# Patient Record
Sex: FEMALE | Race: OTHER | HISPANIC OR LATINO | Employment: FULL TIME | URBAN - METROPOLITAN AREA
[De-identification: names, ages, dates, MRNs, and addresses within clinical notes are randomized per-mention and may not be internally consistent; named-entity substitution may affect disease eponyms.]

---

## 2017-04-03 ENCOUNTER — OFFICE VISIT (OUTPATIENT)
Dept: OBSTETRICS AND GYNECOLOGY | Facility: CLINIC | Age: 22
End: 2017-04-03
Payer: COMMERCIAL

## 2017-04-03 VITALS
DIASTOLIC BLOOD PRESSURE: 60 MMHG | SYSTOLIC BLOOD PRESSURE: 100 MMHG | WEIGHT: 122.81 LBS | BODY MASS INDEX: 22.6 KG/M2 | HEIGHT: 62 IN

## 2017-04-03 DIAGNOSIS — Z01.419 ENCOUNTER FOR GYNECOLOGICAL EXAMINATION: Primary | ICD-10-CM

## 2017-04-03 DIAGNOSIS — Z12.4 PAP SMEAR FOR CERVICAL CANCER SCREENING: ICD-10-CM

## 2017-04-03 PROCEDURE — 99385 PREV VISIT NEW AGE 18-39: CPT | Mod: S$GLB,,, | Performed by: OBSTETRICS & GYNECOLOGY

## 2017-04-03 PROCEDURE — 99999 PR PBB SHADOW E&M-NEW PATIENT-LVL II: CPT | Mod: PBBFAC,,, | Performed by: OBSTETRICS & GYNECOLOGY

## 2017-04-03 PROCEDURE — 88175 CYTOPATH C/V AUTO FLUID REDO: CPT

## 2017-04-03 RX ORDER — NORETHINDRONE ACETATE AND ETHINYL ESTRADIOL AND FERROUS FUMARATE 1MG-20(24)
1 KIT ORAL DAILY
Qty: 84 TABLET | Refills: 3 | Status: SHIPPED | OUTPATIENT
Start: 2017-04-03 | End: 2017-09-12 | Stop reason: SDUPTHER

## 2017-04-03 RX ORDER — NORETHINDRONE ACETATE AND ETHINYL ESTRADIOL AND FERROUS FUMARATE 1MG-20(24)
1 KIT ORAL DAILY
Refills: 11 | COMMUNITY
Start: 2017-03-24 | End: 2017-04-03 | Stop reason: SDUPTHER

## 2017-04-03 NOTE — MR AVS SNAPSHOT
[facility] [address]  [address]  [facility] [place] [address]  Phone: [phone]  Fax: [phone]                  Dominique Huynh   4/3/2017 2:30 PM   Office Visit    Description:  Female : 1995   Provider:  Sherry Mcguire MD   Department:  [facility] [address]           Reason for Visit     Gynecologic Exam                To Do List           Goals (5 Years of Data)     None      [facility]     [facility] On [facility] Nurse Care Line -  Assistance  Unless otherwise directed by your provider, please contact [facility], our nurse care line that is available for  assistance.     Registered nurses in the [facility] provide: appointment scheduling, clinical advisement, health education, and other advisory services.  Call: [phone] (toll free)               Medications           Message regarding Medications     Verify the changes and/or additions to your medication regime listed below are the same as discussed with your clinician today.  If any of these changes or additions are incorrect, please notify your healthcare provider.        STOP taking these medications     hydrocodone-acetaminophen 5-325mg (NORCO) 5-325 mg per tablet Take 1 tablet by mouth every 6 (six) hours as needed.           Verify that the below list of medications is an accurate representation of the medications you are currently taking.  If none reported, the list may be blank. If incorrect, please contact your healthcare provider. Carry this list with you in case of emergency.           Current Medications     MINASTRIN 24 FE 1 mg-20 mcg(24) /75 mg (4) Chew Take 1 tablet by mouth once daily.    sumatriptan (IMITREX) 50 MG tablet Take 1 tablet (50 mg total) by mouth as needed for Migraine (can repeat dose in 2 hours if neeeded.  Max daily dose 200mg.  ).           Clinical Reference Information           Your Vitals Were     BP Height Weight Last Period BMI    100/60 5'  "2" (1.575 m) 55.7 kg (122 lb 12.7 oz) 02/03/2017 22.46 kg/m2      Blood Pressure          Most Recent Value    BP  100/60      Allergies as of 4/3/2017     No Known Allergies      Immunizations Administered on Date of Encounter - 4/3/2017     None      [facility] Sign-Up     Activating your [facility] account is as easy as 1-2-3!     1) Visit [email or web address], select Sign Up Now, enter this activation code and your date of birth, then select Next.  [email or web address]  Expires: 5/18/2017  2:45 PM      2) Create a username and password to use when you visit [facility] in the future and select a security question in case you lose your password and select Next.    3) Enter your e-mail address and click Sign Up!    Additional Information  If you have questions, please e-mail [email or web address] or call [phone] to talk to our [facility] staff. Remember, [facility] is NOT to be used for urgent needs. For medical emergencies, dial 911.         Language Assistance Services     ATTENTION: Language assistance services are available, free of charge. Please call [phone].      ATENCIÓN: Si kiel oh, tiene a clement disposición servicios gratuitos de asistencia lingüística. Llame al [phone].     CHÚ Ý: N?u b?n nói Ti?ng Vi?t, có các d?ch v? h? tr? ngôn ng? mi?n phí dành cho b?n. G?i s? [phone].         [facility] [address] complies with applicable Federal civil rights laws and does not discriminate on the basis of race, color, national origin, age, disability, or sex.        "

## 2017-04-03 NOTE — PROGRESS NOTES
HPI: Pt is a 21 y.o.  female who presents for routine annual exam. She uses OCP for contraception as well as for treatment of endometriosis. She does not want STD screening. No complaints.       ROS:  GENERAL: Feeling well overall. Denies fever or chills.   SKIN: Denies rash or lesions.   HEAD: Denies head injury or headache.   NODES: Denies enlarged lymph nodes.   CHEST: Denies chest pain or shortness of breath.   CARDIOVASCULAR: Denies palpitations or left sided chest pain.   ABDOMEN: No abdominal pain, constipation, diarrhea, nausea, vomiting or rectal bleeding.   URINARY: No dysuria, hematuria, or burning on urination.  REPRODUCTIVE: See HPI.   BREASTS: Denies pain, lumps, or nipple discharge.   HEMATOLOGIC: No easy bruisability or excessive bleeding.   MUSCULOSKELETAL: Denies joint pain or swelling.   NEUROLOGIC: Denies syncope or weakness.   PSYCHIATRIC: Denies depression, anxiety or mood swings.    PE:   APPEARANCE: Well nourished, well developed, White female in no acute distress.  NODES: no cervical, supraclavicular, or inguinal lymphadenopathy  BREASTS: Symmetrical, no skin changes or visible lesions. No palpable masses, nipple discharge or adenopathy bilaterally.  ABDOMEN: Soft. No tenderness or masses. No distention. No hernias palpated. No CVA tenderness.  VULVA: No lesions. Normal external female genitalia.  URETHRAL MEATUS: Normal size and location, no lesions, no prolapse.  URETHRA: No masses, tenderness, or prolapse.  VAGINA: Moist. No lesions or lacerations noted. No abnormal discharge present. No odor present.   CERVIX: No lesions or discharge. No cervical motion tenderness.   UTERUS: Normal size, regular shape, mobile, non-tender.  ADNEXA: No tenderness. No fullness or masses palpated in the adnexal regions.   ANUS PERINEUM: Normal.      Diagnosis:  1. Encounter for gynecological examination    2. Pap smear for cervical cancer screening        Plan:     Orders Placed This Encounter     Liquid-based pap smear, screening    MINASTRIN 24 FE 1 mg-20 mcg(24) /75 mg (4) Chew       Patient was counseled today on the new ACS guidelines for cervical cytology screening as well as the current recommendations for breast cancer screening. She was counseled to follow up with her PCP for other routine health maintenance. Counseling session lasted approximately 10 minutes, and all her questions were answered.    Follow-up with me in 1 year for routine exam; pap in 3 years. PT only wants pelvic exam q 3 years if paps normal.

## 2017-05-12 ENCOUNTER — HOSPITAL ENCOUNTER (OUTPATIENT)
Dept: RADIOLOGY | Facility: HOSPITAL | Age: 22
Discharge: HOME | End: 2017-05-12
Attending: ORTHOPAEDIC SURGERY
Payer: COMMERCIAL

## 2017-05-12 ENCOUNTER — PATIENT MESSAGE (OUTPATIENT)
Dept: ORTHOPEDICS | Facility: CLINIC | Age: 22
End: 2017-05-12

## 2017-05-12 ENCOUNTER — TELEPHONE (OUTPATIENT)
Dept: ORTHOPEDICS | Facility: CLINIC | Age: 22
End: 2017-05-12

## 2017-05-12 ENCOUNTER — OFFICE VISIT (OUTPATIENT)
Dept: ORTHOPEDICS | Facility: CLINIC | Age: 22
End: 2017-05-12
Payer: COMMERCIAL

## 2017-05-12 DIAGNOSIS — M25.562 ACUTE PAIN OF LEFT KNEE: Primary | ICD-10-CM

## 2017-05-12 DIAGNOSIS — M25.562 ACUTE PAIN OF LEFT KNEE: ICD-10-CM

## 2017-05-12 PROCEDURE — 1160F RVW MEDS BY RX/DR IN RCRD: CPT | Mod: S$GLB,,, | Performed by: PHYSICIAN ASSISTANT

## 2017-05-12 PROCEDURE — 99999 PR PBB SHADOW E&M-EST. PATIENT-LVL II: CPT | Mod: PBBFAC,,, | Performed by: PHYSICIAN ASSISTANT

## 2017-05-12 PROCEDURE — 99203 OFFICE O/P NEW LOW 30 MIN: CPT | Mod: S$GLB,,, | Performed by: PHYSICIAN ASSISTANT

## 2017-05-12 NOTE — TELEPHONE ENCOUNTER
Ortho Telephone Triage Message  1500  Patient C/O: left knee pain. Requests same day Ortho appt.   Resolution: Noted appt scheduled with Dawit CALLAHAN PA at 3:00pm today.

## 2017-05-12 NOTE — TELEPHONE ENCOUNTER
----- Message from Bessy Cruz sent at 5/12/2017 12:43 PM CDT -----  Contact: pt  Pt called and states she wants to be seen today due to she can not walk or stand due to her left knee pain, she would like for someone to call her in regards to an appt for today. Pt can be reached at [phone].

## 2017-05-13 ENCOUNTER — HOSPITAL ENCOUNTER (OUTPATIENT)
Dept: RADIOLOGY | Facility: HOSPITAL | Age: 22
Discharge: HOME | End: 2017-05-13
Attending: ORTHOPAEDIC SURGERY
Payer: COMMERCIAL

## 2017-05-13 DIAGNOSIS — M25.562 ACUTE PAIN OF LEFT KNEE: ICD-10-CM

## 2017-05-13 PROCEDURE — 73721 MRI JNT OF LWR EXTRE W/O DYE: CPT | Mod: 26,LT,, | Performed by: RADIOLOGY

## 2017-05-13 PROCEDURE — 73721 MRI JNT OF LWR EXTRE W/O DYE: CPT | Mod: TC,LT

## 2017-05-15 ENCOUNTER — TELEPHONE (OUTPATIENT)
Dept: ORTHOPEDICS | Facility: CLINIC | Age: 22
End: 2017-05-15

## 2017-05-15 NOTE — PROGRESS NOTES
Subjective:      Patient ID: Dominique Huynh is a 21 y.o. female.    Chief Complaint: No chief complaint on file.    HPI    Patient is a 21 year old female who presents to clinic with chief complaint of sudden onset of sharp left knee pain x 1 week. Associated symptoms include swelling Patient was seen in an urgent care who got an x-ray, no fracture seen. Patient has been using crutches to assist with ambulation. Pain is creased with all range of motion and weightbearing. Patient is taking NSAID without relief. Patient is also using a knee brace. She denied fever or shills or history of gout.    Review of Systems   Constitution: Negative for chills and fever.   Cardiovascular: Negative for chest pain.   Respiratory: Negative for cough and shortness of breath.    Skin: Negative for color change, dry skin, itching, nail changes, poor wound healing and rash.   Musculoskeletal:        Left knee pain.    Neurological: Negative for dizziness.   Psychiatric/Behavioral: Negative for altered mental status. The patient is not nervous/anxious.    All other systems reviewed and are negative.        Objective:      General    Constitutional: She is oriented to person, place, and time. She appears well-developed and well-nourished. No distress.   HENT:   Head: Atraumatic.   Eyes: Conjunctivae are normal.   Cardiovascular: Normal rate.    Pulmonary/Chest: Effort normal.   Neurological: She is alert and oriented to person, place, and time.   Psychiatric: She has a normal mood and affect. Her behavior is normal.             Left Knee Exam     Inspection   Scars: absent  Swelling: present  Bruising: absent    Range of Motion   Extension: abnormal   Flexion: abnormal     Other   Sensation: normal    Comments:  Patient came in to clinic with knee brace in place as well as crutches,   NWB  Mild swelling  Very TTP with light and deep palpation,   No erythema or increased warmth          Assessment:       Encounter Diagnosis    Name Primary?    Acute pain of left knee Yes          Plan:       Discussed plan with patient.   - rest ice elevation compression  - Order for labs placed, CBC, CPR, SED  - Order for MRI placed.   - Will call with results, follow up is pending results.

## 2017-09-05 ENCOUNTER — TELEPHONE (OUTPATIENT)
Dept: OBSTETRICS AND GYNECOLOGY | Facility: CLINIC | Age: 22
End: 2017-09-05

## 2017-09-05 NOTE — TELEPHONE ENCOUNTER
----- Message from Ross Whipple sent at 9/5/2017 11:27 AM CDT -----  Contact: Pt (change of pharm)  X_ 1st Request  _ 2nd Request  _ 3rd Request    Who: U[facility] LE [[id number]]    Why: Patient is requesting a refill of MINASTRIN 24 FE 1 mg-20 mcg(24) /75 mg (4) Chew sent to be called into [facility]  #patient would like a call back when prescription is called in#    What Number to Call Back: [phone]    When to Expect a call back: (Before the end of the day)  -- if call after 3:00 call back will be tomorrow.

## 2017-09-12 RX ORDER — NORETHINDRONE ACETATE AND ETHINYL ESTRADIOL AND FERROUS FUMARATE 1MG-20(24)
1 KIT ORAL DAILY
Qty: 84 TABLET | Refills: 2 | Status: SHIPPED | OUTPATIENT
Start: 2017-09-12 | End: 2018-03-29 | Stop reason: DRUGHIGH

## 2017-09-12 NOTE — TELEPHONE ENCOUNTER
----- Message from Venita Rothman sent at 9/12/2017  4:26 PM CDT -----  Contact: self  Contact: Pt (change of pharm)  X_ 1st Request  _ 2nd Request  _ 3rd Request     Who: U[facility] LE [[id number]]     Why: Patient is requesting a refill of MINASTRIN 24 FE 1 mg-20 mcg(24) /75 mg (4) Chew sent to be [facility]  #patient would like a call back when prescription is called in#     What Number to Call Back: [phone]     When to Expect a call back: (Before the end of the day)  -- if call after 3:00 call back will be tomorrow.

## 2018-03-29 ENCOUNTER — OFFICE VISIT (OUTPATIENT)
Dept: OBSTETRICS AND GYNECOLOGY | Facility: CLINIC | Age: 23
End: 2018-03-29
Attending: OBSTETRICS & GYNECOLOGY
Payer: COMMERCIAL

## 2018-03-29 VITALS
SYSTOLIC BLOOD PRESSURE: 110 MMHG | BODY MASS INDEX: 23.37 KG/M2 | HEIGHT: 62 IN | WEIGHT: 127 LBS | DIASTOLIC BLOOD PRESSURE: 84 MMHG

## 2018-03-29 DIAGNOSIS — R10.2 FEMALE PELVIC PAIN: Primary | ICD-10-CM

## 2018-03-29 DIAGNOSIS — R35.0 URINARY FREQUENCY: ICD-10-CM

## 2018-03-29 DIAGNOSIS — N94.6 DYSMENORRHEA: ICD-10-CM

## 2018-03-29 PROCEDURE — 87088 URINE BACTERIA CULTURE: CPT

## 2018-03-29 PROCEDURE — 87077 CULTURE AEROBIC IDENTIFY: CPT

## 2018-03-29 PROCEDURE — 87086 URINE CULTURE/COLONY COUNT: CPT

## 2018-03-29 PROCEDURE — 87186 SC STD MICRODIL/AGAR DIL: CPT

## 2018-03-29 PROCEDURE — 99999 PR PBB SHADOW E&M-EST. PATIENT-LVL III: CPT | Mod: PBBFAC,,, | Performed by: OBSTETRICS & GYNECOLOGY

## 2018-03-29 PROCEDURE — 81001 URINALYSIS AUTO W/SCOPE: CPT

## 2018-03-29 PROCEDURE — 99213 OFFICE O/P EST LOW 20 MIN: CPT | Mod: S$GLB,,, | Performed by: OBSTETRICS & GYNECOLOGY

## 2018-03-29 PROCEDURE — 87491 CHLMYD TRACH DNA AMP PROBE: CPT

## 2018-03-29 RX ORDER — DICLOFENAC POTASSIUM 50 MG/1
50 TABLET, FILM COATED ORAL 2 TIMES DAILY
Qty: 30 TABLET | Refills: 12 | Status: SHIPPED | OUTPATIENT
Start: 2018-03-29 | End: 2018-03-29 | Stop reason: SDUPTHER

## 2018-03-29 RX ORDER — LEVONORGESTREL / ETHINYL ESTRADIOL AND ETHINYL ESTRADIOL 150-30(84)
1 KIT ORAL DAILY
Qty: 91 EACH | Refills: 4 | Status: SHIPPED | OUTPATIENT
Start: 2018-03-29 | End: 2018-07-12 | Stop reason: ALTCHOICE

## 2018-03-29 RX ORDER — DICLOFENAC POTASSIUM 50 MG/1
50 TABLET, FILM COATED ORAL 2 TIMES DAILY
Qty: 30 TABLET | Refills: 12 | Status: SHIPPED | OUTPATIENT
Start: 2018-03-29 | End: 2018-10-30

## 2018-03-29 NOTE — PROGRESS NOTES
Dominique Huynh is a 22 y.o. female patient  NEW TO ME(patient of Dr. Mcguire) who presents today with complaints of pelvic pain /cramping which began a few days ago and have worsened. She also has urinary frequency and back pain for several days. She has a history of a laparoscopy for Endometriosis, diagnosed by laparoscopy by Dr. Kelly several years ago and takes her OCP's continuously for several months at a time to avoid having menstrual cycles. She denies currently being sexually active.  Patient's last menstrual period was 2018 (approximate).    Past Medical History:   Diagnosis Date    Migraines      Past Surgical History:   Procedure Laterality Date    PELVIC LAPAROSCOPY      Endometriosis     Social History     Social History    Marital status: Single     Spouse name: N/A    Number of children: N/A    Years of education: N/A     Occupational History     at [employer]     Social History Main Topics    Smoking status: Never Smoker    Smokeless tobacco: Never Used    Alcohol use No    Drug use: No    Sexual activity: Not Currently     Partners: Male     Birth control/ protection: None, OCP      Comment: has not been sexually active      Other Topics Concern    Not on file     Social History Narrative    Lives w/mother.     Family History   Problem Relation Age of Onset    Diabetes Maternal Aunt     Miscarriages / Stillbirths Mother     Diabetes Father     Breast cancer Neg Hx     Colon cancer Neg Hx     Ovarian cancer Neg Hx      OB History      Para Term  AB Living    0 0 0 0 0 0    SAB TAB Ectopic Multiple Live Births    0 0 0 0                  ROS:  GENERAL: Feeling well overall.   SKIN: Denies rash or lesions.   HEAD: Denies head injury or headache.   NODES: Denies enlarged lymph nodes.   CHEST: Denies chest pain or shortness of breath.   CARDIOVASCULAR: Denies palpitations or left sided chest pain.   ABDOMEN: No abdominal pain,  "nausea, vomiting or rectal bleeding.   URINARY: No dysuria, hematuria, or burning on urination.  REPRODUCTIVE: See HPI.   BREASTS: Denies pain, lumps, or nipple discharge.   HEMATOLOGIC: No easy bruisability or excessive bleeding.   MUSCULOSKELETAL: Denies joint pain or swelling.   NEUROLOGIC: Denies syncope or weakness.   PSYCHIATRIC: Denies depression.    /84   Ht 5' 2" (1.575 m)   Wt 57.6 kg (126 lb 15.8 oz)   LMP 01/20/2018 (Approximate)   BMI 23.23 kg/m²     PE:   APPEARANCE: Well nourished, well developed, in no acute distress.  ABDOMEN: Soft. No masses. No hernias. No CVA tenderness. MILD TENDERNESS DIRECTLY BENEATH LAPAROSCOPY SCAR ON RLQ.   VULVA: No lesions. Normal female genitalia.  URETHRAL MEATUS: Normal size and location, no lesions, no prolapse.  URETHRA: No masses, tenderness, prolapse or scarring.  VAGINA: Moist and well rugated, no discharge, no significant cystocele or rectocele.  CERVIX: No lesions and discharge. NO CMT  UTERUS: Normal size, regular shape, mobile, non-tender, bladder base nontender.  ADNEXA: No masses, tenderness or CDS nodularity. NO CDS TENDERNESS  ANUS PERINEUM: Normal.    PROCEDURES:    1. Female pelvic pain  C. trachomatis/N. gonorrhoeae by AMP DNA Cervix   2. Urinary frequency  Urine culture    Urinalysis   3. Dysmenorrhea  L norgest/e.estradiol-e.estrad 0.15 mg-30 mcg (84)/10 mcg (7) 3MPk    AND PLAN:    Discussed use of continuous use low dose pills to reduce incidence of dysmenorrhea, as well as the option of using DepoProvera (she declined as it caused her weight gain in the past). Discussed Progesterone IUD. Discussed use of Lupron if pain continues/worsens.     Follow-up if symptoms worsen or fail to improve.    "

## 2018-03-30 LAB
BACTERIA #/AREA URNS AUTO: NORMAL /HPF
BILIRUB UR QL STRIP: NEGATIVE
CLARITY UR REFRACT.AUTO: CLEAR
COLOR UR AUTO: ABNORMAL
GLUCOSE UR QL STRIP: NEGATIVE
HGB UR QL STRIP: NEGATIVE
KETONES UR QL STRIP: NEGATIVE
LEUKOCYTE ESTERASE UR QL STRIP: NEGATIVE
MICROSCOPIC COMMENT: NORMAL
NITRITE UR QL STRIP: POSITIVE
PH UR STRIP: 6 [PH] (ref 5–8)
PROT UR QL STRIP: NEGATIVE
RBC #/AREA URNS AUTO: 1 /HPF (ref 0–4)
SP GR UR STRIP: 1.02 (ref 1–1.03)
SQUAMOUS #/AREA URNS AUTO: 0 /HPF
URN SPEC COLLECT METH UR: ABNORMAL
UROBILINOGEN UR STRIP-ACNC: 2 EU/DL
WBC #/AREA URNS AUTO: 0 /HPF (ref 0–5)

## 2018-03-31 LAB
C TRACH DNA SPEC QL NAA+PROBE: NOT DETECTED
N GONORRHOEA DNA SPEC QL NAA+PROBE: NOT DETECTED

## 2018-04-02 DIAGNOSIS — N39.0 UTI (URINARY TRACT INFECTION), UNCOMPLICATED: Primary | ICD-10-CM

## 2018-04-02 LAB — BACTERIA UR CULT: NORMAL

## 2018-04-02 RX ORDER — SULFAMETHOXAZOLE AND TRIMETHOPRIM 800; 160 MG/1; MG/1
1 TABLET ORAL 2 TIMES DAILY
Qty: 14 TABLET | Refills: 0 | Status: SHIPPED | OUTPATIENT
Start: 2018-04-02 | End: 2018-10-01 | Stop reason: ALTCHOICE

## 2018-04-02 RX ORDER — PHENAZOPYRIDINE HYDROCHLORIDE 200 MG/1
200 TABLET, FILM COATED ORAL 3 TIMES DAILY PRN
Qty: 6 TABLET | Refills: 0 | Status: SHIPPED | OUTPATIENT
Start: 2018-04-02 | End: 2018-04-04

## 2018-07-11 ENCOUNTER — TELEPHONE (OUTPATIENT)
Dept: OBSTETRICS AND GYNECOLOGY | Facility: CLINIC | Age: 23
End: 2018-07-11

## 2018-07-11 NOTE — TELEPHONE ENCOUNTER
Pt requests change of birth control medication. States that she is having acne breakouts on her cheeks and noticed that this only started when she started this medication.

## 2018-07-11 NOTE — TELEPHONE ENCOUNTER
----- Message from Zeferino Rand sent at 7/11/2018  1:23 PM CDT -----  Please call pt regarding her birth control [phone]

## 2018-07-12 DIAGNOSIS — N94.6 DYSMENORRHEA: Primary | ICD-10-CM

## 2018-07-12 RX ORDER — DESOGESTREL AND ETHINYL ESTRADIOL 0.15-0.03
1 KIT ORAL DAILY
Qty: 28 TABLET | Refills: 12 | Status: SHIPPED | OUTPATIENT
Start: 2018-07-12 | End: 2018-10-30 | Stop reason: SDUPTHER

## 2018-07-12 NOTE — TELEPHONE ENCOUNTER
Discussed acne on seasonale. Will switch to Desogen, and advised to take daily x 3 weeks, skip placebo week and continue immediately with new pack.

## 2018-10-01 ENCOUNTER — HOSPITAL ENCOUNTER (OUTPATIENT)
Dept: RADIOLOGY | Facility: OTHER | Age: 23
Discharge: HOME | End: 2018-10-01
Attending: OBSTETRICS & GYNECOLOGY

## 2018-10-01 ENCOUNTER — OFFICE VISIT (OUTPATIENT)
Dept: OBSTETRICS AND GYNECOLOGY | Facility: CLINIC | Age: 23
End: 2018-10-01
Attending: OBSTETRICS & GYNECOLOGY

## 2018-10-01 VITALS
DIASTOLIC BLOOD PRESSURE: 62 MMHG | BODY MASS INDEX: 22.84 KG/M2 | WEIGHT: 124.13 LBS | HEIGHT: 62 IN | SYSTOLIC BLOOD PRESSURE: 90 MMHG

## 2018-10-01 DIAGNOSIS — R10.2 FEMALE PELVIC PAIN: ICD-10-CM

## 2018-10-01 DIAGNOSIS — R10.2 FEMALE PELVIC PAIN: Primary | ICD-10-CM

## 2018-10-01 LAB
B-HCG UR QL: NEGATIVE
CTP QC/QA: YES

## 2018-10-01 PROCEDURE — 76856 US EXAM PELVIC COMPLETE: CPT | Mod: TC

## 2018-10-01 PROCEDURE — 87086 URINE CULTURE/COLONY COUNT: CPT

## 2018-10-01 PROCEDURE — 76856 US EXAM PELVIC COMPLETE: CPT | Mod: 26,,, | Performed by: RADIOLOGY

## 2018-10-01 PROCEDURE — 99213 OFFICE O/P EST LOW 20 MIN: CPT | Mod: S$PBB,,, | Performed by: OBSTETRICS & GYNECOLOGY

## 2018-10-01 PROCEDURE — 99999 PR PBB SHADOW E&M-EST. PATIENT-LVL III: CPT | Mod: PBBFAC,,, | Performed by: OBSTETRICS & GYNECOLOGY

## 2018-10-01 PROCEDURE — 76830 TRANSVAGINAL US NON-OB: CPT | Mod: TC

## 2018-10-01 PROCEDURE — 81025 URINE PREGNANCY TEST: CPT | Mod: PBBFAC | Performed by: OBSTETRICS & GYNECOLOGY

## 2018-10-01 PROCEDURE — 99213 OFFICE O/P EST LOW 20 MIN: CPT | Mod: PBBFAC,25 | Performed by: OBSTETRICS & GYNECOLOGY

## 2018-10-01 PROCEDURE — 76830 TRANSVAGINAL US NON-OB: CPT | Mod: 26,,, | Performed by: RADIOLOGY

## 2018-10-01 PROCEDURE — 87491 CHLMYD TRACH DNA AMP PROBE: CPT

## 2018-10-01 NOTE — PROGRESS NOTES
Dominique Huynh is a 23 y.o. female patient  who presents today with complaints of lower back pain and cramps for the past 2 weeks. She denies dyspareunia. She had a light cycle lasting 3 days when she took a week off her pills to allow a withdrawal bleed and the period was not excessively painful.   Patient's last menstrual period was 2018 (approximate).    Past Medical History:   Diagnosis Date    Migraines      Past Surgical History:   Procedure Laterality Date    PELVIC LAPAROSCOPY      Endometriosis     Social History     Socioeconomic History    Marital status: Single     Spouse name: Not on file    Number of children: Not on file    Years of education: Not on file    Highest education level: Not on file   Social Needs    Financial resource strain: Not on file    Food insecurity - worry: Not on file    Food insecurity - inability: Not on file    Transportation needs - medical: Not on file    Transportation needs - non-medical: Not on file   Occupational History    Occupation:  at [employer]     Employer: [employer]   Tobacco Use    Smoking status: Never Smoker    Smokeless tobacco: Never Used   Substance and Sexual Activity    Alcohol use: No    Drug use: No    Sexual activity: Yes     Partners: Male     Birth control/protection: OCP     Comment: sexually active for 5 months- same partner   Other Topics Concern    Not on file   Social History Narrative    Lives w/mother.     Family History   Problem Relation Age of Onset    Diabetes Maternal Aunt     Miscarriages / Stillbirths Mother     Diabetes Father     Breast cancer Neg Hx     Colon cancer Neg Hx     Ovarian cancer Neg Hx      OB History      Para Term  AB Living    0 0 0 0 0 0    SAB TAB Ectopic Multiple Live Births    0 0 0 0                  ROS:  GENERAL: Feeling well overall.   SKIN: Denies rash or lesions.   HEAD: Denies head injury or headache.   NODES: Denies enlarged lymph nodes.  "  CHEST: Denies chest pain or shortness of breath.   CARDIOVASCULAR: Denies palpitations or left sided chest pain.   ABDOMEN: No abdominal pain, nausea, vomiting or rectal bleeding.   URINARY: No dysuria, hematuria, or burning on urination.  REPRODUCTIVE: See HPI.   BREASTS: Denies pain, lumps, or nipple discharge.   HEMATOLOGIC: No easy bruisability or excessive bleeding.   MUSCULOSKELETAL: Denies joint pain or swelling.   NEUROLOGIC: Denies syncope or weakness.   PSYCHIATRIC: Denies depression.    BP 90/62 (BP Location: Left arm)   Ht 5' 2" (1.575 m)   Wt 56.3 kg (124 lb 1.9 oz)   LMP 09/17/2018 (Approximate)   BMI 22.70 kg/m²     PE:   APPEARANCE: Well nourished, well developed, in no acute distress.  ABDOMEN: Soft. No tenderness or masses. No hernias. No CVA tenderness.  VULVA: No lesions. Normal female genitalia.  URETHRAL MEATUS: Normal size and location, no lesions, no prolapse.  URETHRA: No masses, tenderness, prolapse or scarring.  VAGINA: Moist and well rugated, no discharge, no significant cystocele or rectocele.  CERVIX: No lesions and discharge.   UTERUS: Normal size, regular shape, mobile, non-tender, bladder base nontender.  ADNEXA: No masses, tenderness or CDS nodularity.  ANUS PERINEUM: Normal.    PROCEDURES:    1. Female pelvic pain  C. trachomatis/N. gonorrhoeae by AMP DNA    US Pelvis Comp with Transvag NON-OB (xpd    Urine culture    POCT Urine Pregnancy    AND PLAN:    Discussed medical management, including Depo Provera and Progesterone IUD. Patient will consider.   Follow-up if symptoms worsen or fail to improve.    "

## 2018-10-02 DIAGNOSIS — R10.9 ABDOMINAL CRAMPS: Primary | ICD-10-CM

## 2018-10-02 LAB
C TRACH DNA SPEC QL NAA+PROBE: NOT DETECTED
N GONORRHOEA DNA SPEC QL NAA+PROBE: NOT DETECTED

## 2018-10-02 RX ORDER — DICYCLOMINE HYDROCHLORIDE 10 MG/1
10 CAPSULE ORAL 2 TIMES DAILY
Qty: 60 CAPSULE | Refills: 3 | Status: SHIPPED | OUTPATIENT
Start: 2018-10-02 | End: 2018-10-30

## 2018-10-03 ENCOUNTER — PATIENT MESSAGE (OUTPATIENT)
Dept: OBSTETRICS AND GYNECOLOGY | Facility: CLINIC | Age: 23
End: 2018-10-03

## 2018-10-03 LAB
BACTERIA UR CULT: NORMAL
BACTERIA UR CULT: NORMAL

## 2018-10-15 ENCOUNTER — LAB VISIT (OUTPATIENT)
Dept: LAB | Facility: OTHER | Age: 23
End: 2018-10-15
Attending: OBSTETRICS & GYNECOLOGY

## 2018-10-15 ENCOUNTER — OFFICE VISIT (OUTPATIENT)
Dept: OBSTETRICS AND GYNECOLOGY | Facility: CLINIC | Age: 23
End: 2018-10-15
Attending: OBSTETRICS & GYNECOLOGY

## 2018-10-15 VITALS
HEIGHT: 62 IN | BODY MASS INDEX: 23.4 KG/M2 | SYSTOLIC BLOOD PRESSURE: 100 MMHG | DIASTOLIC BLOOD PRESSURE: 60 MMHG | WEIGHT: 127.19 LBS

## 2018-10-15 DIAGNOSIS — R10.2 FEMALE PELVIC PAIN: Primary | ICD-10-CM

## 2018-10-15 DIAGNOSIS — R10.2 FEMALE PELVIC PAIN: ICD-10-CM

## 2018-10-15 DIAGNOSIS — N80.9 ENDOMETRIOSIS: ICD-10-CM

## 2018-10-15 PROCEDURE — 99213 OFFICE O/P EST LOW 20 MIN: CPT | Mod: PBBFAC | Performed by: OBSTETRICS & GYNECOLOGY

## 2018-10-15 PROCEDURE — 87086 URINE CULTURE/COLONY COUNT: CPT

## 2018-10-15 PROCEDURE — 99213 OFFICE O/P EST LOW 20 MIN: CPT | Mod: S$PBB,,, | Performed by: OBSTETRICS & GYNECOLOGY

## 2018-10-15 PROCEDURE — 99999 PR PBB SHADOW E&M-EST. PATIENT-LVL III: CPT | Mod: PBBFAC,,, | Performed by: OBSTETRICS & GYNECOLOGY

## 2018-10-15 RX ORDER — TRAMADOL HYDROCHLORIDE 50 MG/1
50 TABLET ORAL EVERY 8 HOURS PRN
Qty: 20 TABLET | Refills: 0 | Status: SHIPPED | OUTPATIENT
Start: 2018-10-15 | End: 2018-10-31 | Stop reason: SDUPTHER

## 2018-10-15 NOTE — PROGRESS NOTES
Subjective:       Patient ID: Dominique Huynh is a 23 y.o. female.    Chief Complaint:  Cramping      History of Present Illness  HPI  The patient presents with continued pelvic pain , cramping and lower back pain. She has a history of laparoscopy proven endometriosis for which she has been on continuous OCP's since . She has also recent had several Uti's for which she has been treated.  A trial of Bentyl has been unsuccessful. She appears uncomfortable, and states she is having trouble going to classes due to discomfort.    GYN & OB History  Patient's last menstrual period was 2018 (approximate).   Date of Last Pap: 2017  U/S: 10-1-2018:  EXAMINATION:  US PELVIS COMP WITH TRANSVAG NON-OB (XPD)    CLINICAL HISTORY:  Pelvic and perineal pain    TECHNIQUE:  Transabdominal sonography of the pelvis was performed, followed by transvaginal sonography to better evaluate the uterus and ovaries.    COMPARISON:  2013    FINDINGS:  Uterus:    Size: 5.9 x 3.2 x 3.3 cm    Appearance: No masses.    Endometrium: Not thickened in this pre menopausal patient, measuring 2 mm.    Right ovary:    Size: 2.3 x 1.3 x 2.1 cm    Appearance: Within normal limits    Vascular flow: Normal.    Left ovary:    Size: 2.2 x 1.7 x 1.4 cm    Appearance: Within normal limits    Vascular Flow: Normal.    Free Fluid:    None.      Impression       No sonographic abnormality.         OB History    Para Term  AB Living   0 0 0 0 0 0   SAB TAB Ectopic Multiple Live Births   0 0 0 0               Review of Systems  Review of Systems   Constitutional: Negative for activity change, appetite change, fatigue and unexpected weight change.   HENT: Negative.    Eyes: Negative for visual disturbance.   Respiratory: Negative for shortness of breath and wheezing.    Cardiovascular: Negative for chest pain, palpitations and leg swelling.   Gastrointestinal: Negative for abdominal pain, bloating and blood in stool.    Endocrine: Negative for diabetes and hair loss.   Genitourinary: Positive for dyspareunia, frequency and pelvic pain. Negative for decreased libido.   Musculoskeletal: Negative for back pain and joint swelling.   Skin:  Negative for no acne and hair changes.   Neurological: Negative for headaches.   Hematological: Does not bruise/bleed easily.   Psychiatric/Behavioral: Negative for depression and sleep disturbance. The patient is not nervous/anxious.    Breast: Negative for breast pain and nipple discharge          Objective:      Physical Exam:   Constitutional: She is oriented to person, place, and time. She appears well-developed and well-nourished.    HENT:   Head: Normocephalic and atraumatic.    Eyes: EOM are normal. Pupils are equal, round, and reactive to light.    Neck: Normal range of motion. Neck supple.    Cardiovascular: Normal rate and regular rhythm.     Pulmonary/Chest: Effort normal and breath sounds normal.        Abdominal: Soft. Bowel sounds are normal.     Genitourinary: Pelvic exam was performed with patient supine.   Genitourinary Comments: PELVIC: Normal external genitalia without lesions.  Normal hair distribution.  Adequate perineal body, normal urethral meatus.  Vagina moist and well rugated without lesions or discharge.  Cervix pink, without lesions, discharge or tenderness.  No significant cystocele or rectocele.  Bimanual exam shows uterus to be normal size, regular, mobile and MILDLY tender R fundus. .  Adnexa without palpable masses or tenderness.    Mild culdesac tenderness             Musculoskeletal: Normal range of motion and moves all extremeties.       Neurological: She is alert and oriented to person, place, and time.    Skin: Skin is warm and dry.    Psychiatric: She has a normal mood and affect.            Assessment:        1. Female pelvic pain    2. Endometriosis                Plan:      1. Female pelvic pain    - Urine culture  - traMADol (ULTRAM) 50 mg tablet; Take 1  tablet (50 mg total) by mouth every 8 (eight) hours as needed for Pain.  Dispense: 20 tablet; Refill: 0    2. Endometriosis  Will refer to pelvic pain clinic for management/ possible endometriosis study       Follow-up if symptoms worsen or fail to improve.

## 2018-10-16 ENCOUNTER — TELEPHONE (OUTPATIENT)
Dept: RESEARCH | Facility: OTHER | Age: 23
End: 2018-10-16

## 2018-10-16 NOTE — TELEPHONE ENCOUNTER
Patient contacted SHAQUILLE Jones.  She is interested in participating in  Endometriosis study.  She was referred to Dr. Gonsalves by Dr. Garnett. Dr. Gonsalves requested that SHAQUILLE Jones reach out to potential subject.    SHAQUILLE Jones spoke with patient on 15 OCT 2018.  Copy of consent was emailed to her as requested.  On 16 OCT 2018 @ 10:15am, subject contacted SHAQUILLE Jones to discuss consent and enrolling in study.      SHAQUILLE Jones informed the patient that since she is currently on birth control, she would not be eligible to participate.  Per the Sponsor ([employer]) study team, the site was notified that subjects requiring washout (who are on birth control) can not be consented as they will not have enough time to complete the Washout Period and enter into Screening prior to 03-NOV-18.    Patient expressed that she understood what was discussed with her and that she had no further questions.  SHAQUILLE Jones informed the patient that she would contact her if the study team extended the deadline which would allow her to participate in the study.

## 2018-10-17 ENCOUNTER — PATIENT MESSAGE (OUTPATIENT)
Dept: OBSTETRICS AND GYNECOLOGY | Facility: CLINIC | Age: 23
End: 2018-10-17

## 2018-10-17 LAB
BACTERIA UR CULT: NORMAL
BACTERIA UR CULT: NORMAL

## 2018-10-18 ENCOUNTER — TELEPHONE (OUTPATIENT)
Dept: OBSTETRICS AND GYNECOLOGY | Facility: CLINIC | Age: 23
End: 2018-10-18

## 2018-10-18 NOTE — TELEPHONE ENCOUNTER
----- Message from Arnie Gonsalves MD sent at 10/18/2018  9:28 AM CDT -----  Elsie Dougherty,  Can you call this patient to get her plugged in for a consult?  Ideally if I can see her next week, that would be wonderful.    Thanks  [facility]    ----- Message -----  From: Ephraim Pineda MD  Sent: 10/16/2018   6:23 PM  To: MD Yusra Aleman,  Thank you for the call; it was nice to briefly speak with you.  This patient attached to the msg is the niece of our , who's been with us since 2007 - it's a great family from [place].  She is a young hard-working woman with increasing pain from endometriosis and the Q is whether any other medical management can be done for the pain, including possibly coming off OCPs to do the study drug you briefly mentioned, or other modality.  Thank you, again, for your help.    Best,  Ephraim

## 2018-10-18 NOTE — TELEPHONE ENCOUNTER
LVM:  Good Morning Ms Medellin, this is Ladonna from Dr Gonsalves's office. Dr Goldsteinsked me to reach out to you so we could schedule you to come in to see him.  Please call our office back at [phone] and I will be happy to place you on his schedule.  Thank You

## 2018-10-18 NOTE — TELEPHONE ENCOUNTER
----- Message from Gumaro Tovar sent at 10/18/2018 10:48 AM CDT -----  Contact: pt            Name of Who is Calling: pt      What is the request in detail: is returning a call back      Can the clinic reply by [facility]: no      What Number [facility]: [phone]

## 2018-10-18 NOTE — TELEPHONE ENCOUNTER
2nd Attempt    Spoke with pt  Pt was scheduled with Dr Gonsalves for pelvic pain evaluation.    Pt verbalized understanding of time, date, and location of upcomming appt.

## 2018-10-30 ENCOUNTER — TELEPHONE (OUTPATIENT)
Dept: RESEARCH | Facility: OTHER | Age: 23
End: 2018-10-30

## 2018-10-30 ENCOUNTER — OFFICE VISIT (OUTPATIENT)
Dept: OBSTETRICS AND GYNECOLOGY | Facility: CLINIC | Age: 23
End: 2018-10-30

## 2018-10-30 VITALS
HEIGHT: 62 IN | WEIGHT: 126.56 LBS | BODY MASS INDEX: 23.29 KG/M2 | SYSTOLIC BLOOD PRESSURE: 100 MMHG | DIASTOLIC BLOOD PRESSURE: 70 MMHG

## 2018-10-30 DIAGNOSIS — N94.6 DYSMENORRHEA: ICD-10-CM

## 2018-10-30 DIAGNOSIS — M62.838 LEVATOR SPASM: ICD-10-CM

## 2018-10-30 PROCEDURE — 99213 OFFICE O/P EST LOW 20 MIN: CPT | Mod: PBBFAC | Performed by: OBSTETRICS & GYNECOLOGY

## 2018-10-30 PROCEDURE — 99244 OFF/OP CNSLTJ NEW/EST MOD 40: CPT | Mod: S$PBB,,, | Performed by: OBSTETRICS & GYNECOLOGY

## 2018-10-30 PROCEDURE — 99999 PR PBB SHADOW E&M-EST. PATIENT-LVL III: CPT | Mod: PBBFAC,,, | Performed by: OBSTETRICS & GYNECOLOGY

## 2018-10-30 RX ORDER — DESOGESTREL AND ETHINYL ESTRADIOL 0.15-0.03
1 KIT ORAL DAILY
Qty: 28 TABLET | Refills: 12 | Status: SHIPPED | OUTPATIENT
Start: 2018-10-30 | End: 2018-12-06

## 2018-10-30 NOTE — PROGRESS NOTES
Subjective:       Patient ID: Dominique Huynh is a 23 y.o. female.    Chief Complaint:  Pelvic Pain      History of Present Illness  HPI  Pt is 23 y.o. sent in consultation from Miguel Garnett and Miriam for further evaluation of her pelvic pain.      Pt was 1st diagnosed with endometriosis at [facility].  She had surgery over there.  Was told that she had stage II endometriosis.  Was started on OCPs in  and had done well. Patient states that over the last 1 and half months she has been having significant pain and back pain. She still takes her birth control pills.  Her periods only last 2 days.  But the pain is so bad that she cannot function.  Pain is primarily on the lower quadrants bilaterally. She does have bloating when she has this pain. Patient states she has pressure with urination and bowel movements.  No blood in her stool or urine.    Of note after her last surgery, she did well.  GYN & OB History  Patient's last menstrual period was 2018 (exact date).   Date of Last Pap: 2017    OB History    Para Term  AB Living   0 0 0 0 0 0   SAB TAB Ectopic Multiple Live Births   0 0 0 0               Review of Systems  Review of Systems   Constitutional: Negative for activity change, appetite change and fatigue.   HENT: Negative.  Negative for tinnitus.    Eyes: Negative.    Respiratory: Negative for cough and shortness of breath.    Cardiovascular: Negative for chest pain and palpitations.   Gastrointestinal: Negative.  Negative for abdominal pain, blood in stool, constipation, diarrhea and nausea.   Endocrine: Negative.  Negative for hot flashes.   Genitourinary: Positive for pelvic pain. Negative for dyspareunia, dysuria, frequency, menstrual problem, vaginal discharge, dysmenorrhea, urinary incontinence and postcoital bleeding.   Musculoskeletal: Negative for back pain and joint swelling.   Skin:  Negative for rash.   Neurological: Negative.  Negative for headaches.    Hematological: Negative.  Does not bruise/bleed easily.   Psychiatric/Behavioral: The patient is not nervous/anxious.    Breast: negative.  Negative for breast mass, nipple discharge and skin changes          Objective:    Physical Exam:   Constitutional: She is oriented to person, place, and time. She appears well-developed and well-nourished. No distress.    HENT:   Head: Normocephalic and atraumatic.    Eyes: Conjunctivae and lids are normal. Pupils are equal, round, and reactive to light.    Neck: Normal range of motion and full passive range of motion without pain. Neck supple.    Cardiovascular: Normal rate and regular rhythm.  Exam reveals no edema.     Pulmonary/Chest: Effort normal and breath sounds normal. She has no wheezes.        Abdominal: Soft. Normal appearance and bowel sounds are normal. She exhibits no distension. There is no tenderness. There is no rebound and no guarding.     Genitourinary: Vagina normal and uterus normal. Pelvic exam was performed with patient supine. There is no tenderness or lesion on the right labia. There is no tenderness or lesion on the left labia. Uterus is not deviated, not fixed and not hosting fibroids. Cervix is normal. Right adnexum displays no mass and no tenderness. Left adnexum displays no mass and no tenderness. No rectocele, cystocele or unspecified prolapse of vaginal walls in the vagina. No vaginal discharge found. Labial bartholins normal.Cervix exhibits no motion tenderness and no discharge.   Genitourinary Comments: Significant pelvic musculature tension (R>L).  Exam limited due to guarding.  No CMT, no masses palpated.           Musculoskeletal: Normal range of motion and moves all extremeties.       Neurological: She is alert and oriented to person, place, and time. She has normal strength.    Skin: Skin is warm and dry.    Psychiatric: She has a normal mood and affect. Her speech is normal and behavior is normal. Thought content normal. Her mood  appears not anxious. She does not exhibit a depressed mood. She expresses no suicidal plans and no homicidal plans.          Assessment:        1. Endometriosis of pelvis    2. Levator spasm    3. Dysmenorrhea                Plan:      Dominique was seen today for pelvic pain.    Diagnoses and all orders for this visit:    Endometriosis of pelvis  -     elagolix 150 mg Tab; Take 150 mg by mouth once daily.  -     Ambulatory consult to Physical Therapy  -     desogestrel-ethinyl estradiol (APRI) 0.15-0.03 mg per tablet; Take 1 tablet by mouth once daily. Take one tablet daily, skip placebo week and start new pack every 3 weeks.  I had a long discussion with the patient regarding the different treatment options for endometriosis.  As patient has been on birth control pills, changing the pills will not offer any additional benefit.  The next most aggressive medical therapy involves elagolix.  Patient would be a good candidate for this medication.  We reviewed the different side effects.  It has been well tolerated.  We will start with a lower dosing to ensure that she can tolerated.  I would anticipate she would be on the medication for 3-6 months at least.  She can continue for up to 2 years.    If her pain is not better, surgical excision is the next option.  We will try medical therapy 1st.  I spent approximately 50 min with the patient.  All questions were answered.  Levator spasm  -     Ambulatory consult to Physical Therapy  Given the significant pelvic muscular tension, patient would greatly benefit from pelvic floor physical therapy.  A consult was placed.  Dysmenorrhea  -     desogestrel-ethinyl estradiol (APRI) 0.15-0.03 mg per tablet; Take 1 tablet by mouth once daily. Take one tablet daily, skip placebo week and start new pack every 3 weeks.

## 2018-10-30 NOTE — TELEPHONE ENCOUNTER
SHAQUILLE Jones contacted patient to inform her of update of [employer]  Endometriosis research study.  This morning, SHAQUILLE Jones spoke with JEAN Melendez who stated that potential washout patients (on hormonal contraceptives) are being considered on a  case-by-case basisdue to target enrollment date.      SHAQUILLE Jones left voicemail message for patient to contact her.

## 2018-10-31 ENCOUNTER — PATIENT MESSAGE (OUTPATIENT)
Dept: OBSTETRICS AND GYNECOLOGY | Facility: CLINIC | Age: 23
End: 2018-10-31

## 2018-10-31 ENCOUNTER — TELEPHONE (OUTPATIENT)
Dept: OBSTETRICS AND GYNECOLOGY | Facility: CLINIC | Age: 23
End: 2018-10-31

## 2018-10-31 DIAGNOSIS — R10.2 FEMALE PELVIC PAIN: ICD-10-CM

## 2018-10-31 RX ORDER — TRAMADOL HYDROCHLORIDE 50 MG/1
50 TABLET ORAL EVERY 8 HOURS PRN
Qty: 20 TABLET | Refills: 0 | Status: SHIPPED | OUTPATIENT
Start: 2018-10-31 | End: 2019-02-12

## 2018-10-31 NOTE — TELEPHONE ENCOUNTER
----- Message from Zeferino Rand sent at 10/31/2018  1:29 PM CDT -----  keiry Dougherty returning your call [phone]

## 2018-10-31 NOTE — TELEPHONE ENCOUNTER
LVM:  Good afternoon Ms Recio-Lopez,   this is Ladonna from Dr Gonsalves's office returning your call regarding your forms for your [employer] Patient Assistance program for the medication Orillisa.  Please call our office back at [phone].    Thank You

## 2018-10-31 NOTE — TELEPHONE ENCOUNTER
----- Message from Denise Tenorio sent at 10/31/2018  2:37 PM CDT -----  Name of Who is Calling: [facility] [[id number]]    What is the request in detail: Pt states the letter that was sent to her is not legible and the Dr needs to fill it out as well. Please contact      Can the clinic reply by [facility]:  Yes        What Number [facility] Back [facility]: [phone]  
Spoke with pt:  Pt is not able to read [employer] Patient Assistance program for the medication Orillisa which was sent to her e mail.  Pt will come in tomorrow morning at 9am to fill out forms and staff will fax it to [employer] on pt's behalf.    Pt verbalized understanding.  
1-2 drinks

## 2018-10-31 NOTE — TELEPHONE ENCOUNTER
Spoke with pt:    Jayne has sent pt a Patient Assistance Program application for Orilissa.    Pt requesting forms be e mailed to her at [email or web address]    Pt is requesting a refill on pain medication until she knows if Orilissa is covered.    Message sent to Dr Gonsalves, and e-mail sent to pt.    Pt verbalized understanding.

## 2018-10-31 NOTE — TELEPHONE ENCOUNTER
----- Message from Dian Orr sent at 10/31/2018 12:53 PM CDT -----  Contact: Patient   Patient called regarding a form for medication enrollment. The patient is inquiring if this form has been received so that she can take the proper steps following. The patient can be reached at [phone].

## 2018-11-01 NOTE — TELEPHONE ENCOUNTER
10/16 - Subject contacted SHAQUILLE Jones.  SHAQUILLE Jones informed the patient that since she is currently on birth control, she would not be eligible to participate.  Per the Sponsor ([employer]) study team, the site was notified that subjects requiring washout (who are on birth control) cannot be consented as they will not have enough time to complete the Washout Period and enter into Screening prior to 03-NOV-18.  SHAQUILLE Jones informed the patient that she would contact her if the study team extended the deadline which would allow her to participate in the study.  Noted in Epic and sent Dr. Gonsalves & Dr. Garnett an email with status.

## 2018-11-06 ENCOUNTER — TELEPHONE (OUTPATIENT)
Dept: OBSTETRICS AND GYNECOLOGY | Facility: CLINIC | Age: 23
End: 2018-11-06

## 2018-11-06 NOTE — TELEPHONE ENCOUNTER
LVM:  Good afternoon Ms SmithRecioAugustDarius, this is Ladonna from Dr Gonsalves's office returning your call. Please call our office back at [phone].    Thank You

## 2018-11-06 NOTE — TELEPHONE ENCOUNTER
Spoke with Pt:  Per Dr Gonsalves pt is to stop taking her Birth Control once she starts Orlilissa medication.  Pt is to use condoms for birth control.  If pt responds well to Orilissa and would like to use a permanent birth control, he recommends an IUD.    Pt to start medication and check in with Dr Gonsalves in three weeks.    Pt verbalized understanding.

## 2018-11-06 NOTE — TELEPHONE ENCOUNTER
----- Message from Luci Parra sent at 11/6/2018  8:33 AM CST -----  Contact: austin Bangura)   Name of Who is Calling: austin Bangura)        What is the request in detail:austin Bangura) is requesting a call to discuss patients medication prescription.... Please contact to further discuss and advise.       Can the clinic reply by [facility]: no         What Number to Call Back if not in [facility]: [phone] ask to speak with a pharmacist

## 2018-11-06 NOTE — TELEPHONE ENCOUNTER
Spoke with Pharmacist for [employer] regarding Orilissa medication.    Pharmacist wanted Dr Gonsalves to know there could be a possible reaction between the Birth Control pt is taking and Orilissa.      Message sent to Dr Gonsalves.

## 2018-11-06 NOTE — TELEPHONE ENCOUNTER
Spoke with pt   Pt was instructed that Dr Gonsalves's office had been contacted by the [employer] and pt was approved for her Orilissa medication.    Pt verbalized understanding.

## 2018-11-06 NOTE — TELEPHONE ENCOUNTER
----- Message from Zeferino Rand sent at 11/6/2018  1:57 PM CST -----  FERDINAND MUNOZ RETURNING YOUR CALL [phone]

## 2018-11-06 NOTE — TELEPHONE ENCOUNTER
----- Message from Luci Parra sent at 11/6/2018  9:35 AM CST -----  Contact: pt   Name of Who is Calling:  [facility] [[id number]]       What is the request in detail: patient is requesting a call in regards to authorization for medication....Please contact to further discuss and advise.       Can the clinic reply by [facility]: yes       What Number to Call Back if not in [facility]: [phone]

## 2018-11-07 ENCOUNTER — TELEPHONE (OUTPATIENT)
Dept: OBSTETRICS AND GYNECOLOGY | Facility: CLINIC | Age: 23
End: 2018-11-07

## 2018-12-06 ENCOUNTER — OFFICE VISIT (OUTPATIENT)
Dept: OBSTETRICS AND GYNECOLOGY | Facility: CLINIC | Age: 23
End: 2018-12-06

## 2018-12-06 VITALS
BODY MASS INDEX: 23.29 KG/M2 | DIASTOLIC BLOOD PRESSURE: 64 MMHG | HEIGHT: 62 IN | WEIGHT: 126.56 LBS | SYSTOLIC BLOOD PRESSURE: 104 MMHG

## 2018-12-06 PROCEDURE — 99213 OFFICE O/P EST LOW 20 MIN: CPT | Mod: S$PBB,,, | Performed by: OBSTETRICS & GYNECOLOGY

## 2018-12-06 PROCEDURE — 99212 OFFICE O/P EST SF 10 MIN: CPT | Mod: PBBFAC | Performed by: OBSTETRICS & GYNECOLOGY

## 2018-12-06 PROCEDURE — 99999 PR PBB SHADOW E&M-EST. PATIENT-LVL II: CPT | Mod: PBBFAC,,, | Performed by: OBSTETRICS & GYNECOLOGY

## 2018-12-06 RX ORDER — MELOXICAM 15 MG/1
15 TABLET ORAL DAILY
Qty: 30 TABLET | Refills: 6 | Status: SHIPPED | OUTPATIENT
Start: 2018-12-06 | End: 2019-10-17

## 2018-12-10 ENCOUNTER — TELEPHONE (OUTPATIENT)
Dept: OBSTETRICS AND GYNECOLOGY | Facility: CLINIC | Age: 23
End: 2018-12-10

## 2018-12-10 NOTE — TELEPHONE ENCOUNTER
----- Message from Ladonna Weber MA sent at 12/10/2018  3:46 PM CST -----  Please place order..  Thank You  Ladonna  ----- Message -----  From: Migdalia Garcia  Sent: 12/10/2018   2:43 PM  To: Brina Zee Staff    Good Afternoon,     Please submit an order for Pelvic Floor PT for the above patient (for diagnoses N80.3& M62.838)  There is currently a referral in Epic, however an order is needed to schedule the patient.    Thanks,   Migdalia Garcia   [id number]

## 2018-12-13 NOTE — PROGRESS NOTES
Subjective:       Patient ID: Dominique Huynh is a 23 y.o. female.    Chief Complaint:  Pelvic Pain and Back Pain      History of Present Illness  HPI  Pt is 23 y.o. here in follow up.  Was started on Elagolix and still having some pain.  Now using it for 2 months.  Less.  Medication is more expensive.  But has been happy with the medication as she is moving in the right direction.    GYN & OB History  Patient's last menstrual period was 2018.   Date of Last Pap: 2017    OB History    Para Term  AB Living   0 0 0 0 0 0   SAB TAB Ectopic Multiple Live Births   0 0 0 0               Review of Systems  Review of Systems   Constitutional: Negative for activity change, appetite change and fatigue.   HENT: Negative.  Negative for tinnitus.    Eyes: Negative.    Respiratory: Negative for cough and shortness of breath.    Cardiovascular: Negative for chest pain and palpitations.   Gastrointestinal: Negative.  Negative for abdominal pain, blood in stool, constipation, diarrhea and nausea.   Endocrine: Negative.  Negative for hot flashes.   Genitourinary: Negative for dyspareunia, dysuria, frequency, menstrual problem, pelvic pain, vaginal discharge, dysmenorrhea, urinary incontinence and postcoital bleeding.   Musculoskeletal: Negative for back pain and joint swelling.   Neurological: Negative.  Negative for headaches.   Hematological: Negative.  Does not bruise/bleed easily.   Psychiatric/Behavioral: The patient is not nervous/anxious.    Breast: negative.  Negative for mass, nipple discharge and skin changes          Objective:    Physical Exam:   Constitutional: She is oriented to person, place, and time. She appears well-developed and well-nourished. No distress.    HENT:   Head: Normocephalic and atraumatic.    Eyes: Conjunctivae and lids are normal. Pupils are equal, round, and reactive to light.    Neck: Normal range of motion and full passive range of motion without pain. Neck  supple.    Cardiovascular: Normal rate and regular rhythm.  Exam reveals no edema.     Pulmonary/Chest: Effort normal and breath sounds normal. She has no wheezes.        Abdominal: Soft. Normal appearance and bowel sounds are normal. She exhibits no distension. There is no tenderness. There is no rebound and no guarding.     Genitourinary: Vagina normal and uterus normal. Pelvic exam was performed with patient supine. There is no tenderness or lesion on the right labia. There is no tenderness or lesion on the left labia. Uterus is not deviated, not fixed and not hosting fibroids. Cervix is normal. Right adnexum displays no mass and no tenderness. Left adnexum displays no mass and no tenderness. No rectocele, cystocele or unspecified prolapse of vaginal walls in the vagina. No vaginal discharge found. Labial bartholins normal.Cervix exhibits no motion tenderness and no discharge.           Musculoskeletal: Normal range of motion and moves all extremeties.       Neurological: She is alert and oriented to person, place, and time. She has normal strength.    Skin: Skin is warm and dry.    Psychiatric: She has a normal mood and affect. Her speech is normal and behavior is normal. Thought content normal. Her mood appears not anxious. She does not exhibit a depressed mood. She expresses no suicidal plans and no homicidal plans.          Assessment:        1. Endometriosis of pelvis                Plan:      Dominique was seen today for pelvic pain and back pain.    Diagnoses and all orders for this visit:    Endometriosis of pelvis  -     meloxicam (MOBIC) 15 MG tablet; Take 1 tablet (15 mg total) by mouth once daily.  We had a long discussion about her current regimen.  Since she is moving in the right direction, I would continue using helical looks at the once a day dosing.  I have also prescribed Mobic to help with anti-inflammatory pain. Hopefully this will help minimize the need for future surgery.  Patient does want  to avoid surgery.  All questions were answered.

## 2019-01-10 ENCOUNTER — OFFICE VISIT (OUTPATIENT)
Dept: OBSTETRICS AND GYNECOLOGY | Facility: CLINIC | Age: 24
End: 2019-01-10
Payer: COMMERCIAL

## 2019-01-10 VITALS
BODY MASS INDEX: 23.65 KG/M2 | WEIGHT: 128.5 LBS | SYSTOLIC BLOOD PRESSURE: 100 MMHG | HEIGHT: 62 IN | DIASTOLIC BLOOD PRESSURE: 52 MMHG

## 2019-01-10 DIAGNOSIS — M62.838 LEVATOR SPASM: Primary | ICD-10-CM

## 2019-01-10 DIAGNOSIS — R31.1 BENIGN ESSENTIAL MICROSCOPIC HEMATURIA: ICD-10-CM

## 2019-01-10 PROCEDURE — 99214 OFFICE O/P EST MOD 30 MIN: CPT | Mod: S$GLB,,, | Performed by: OBSTETRICS & GYNECOLOGY

## 2019-01-10 PROCEDURE — 99214 PR OFFICE/OUTPT VISIT, EST, LEVL IV, 30-39 MIN: ICD-10-PCS | Mod: S$GLB,,, | Performed by: OBSTETRICS & GYNECOLOGY

## 2019-01-10 PROCEDURE — 99999 PR PBB SHADOW E&M-EST. PATIENT-LVL II: ICD-10-PCS | Mod: PBBFAC,,, | Performed by: OBSTETRICS & GYNECOLOGY

## 2019-01-10 PROCEDURE — 87086 URINE CULTURE/COLONY COUNT: CPT

## 2019-01-10 PROCEDURE — 99999 PR PBB SHADOW E&M-EST. PATIENT-LVL II: CPT | Mod: PBBFAC,,, | Performed by: OBSTETRICS & GYNECOLOGY

## 2019-01-10 PROCEDURE — 3008F BODY MASS INDEX DOCD: CPT | Mod: CPTII,S$GLB,, | Performed by: OBSTETRICS & GYNECOLOGY

## 2019-01-10 PROCEDURE — 3008F PR BODY MASS INDEX (BMI) DOCUMENTED: ICD-10-PCS | Mod: CPTII,S$GLB,, | Performed by: OBSTETRICS & GYNECOLOGY

## 2019-01-10 RX ORDER — CYCLOBENZAPRINE HCL 10 MG
10 TABLET ORAL 3 TIMES DAILY PRN
Qty: 90 TABLET | Refills: 1 | Status: SHIPPED | OUTPATIENT
Start: 2019-01-10 | End: 2019-02-09

## 2019-01-11 LAB
BACTERIA UR CULT: NORMAL
BACTERIA UR CULT: NORMAL

## 2019-01-16 ENCOUNTER — TELEPHONE (OUTPATIENT)
Dept: OBSTETRICS AND GYNECOLOGY | Facility: CLINIC | Age: 24
End: 2019-01-16

## 2019-01-16 NOTE — TELEPHONE ENCOUNTER
Spoke with pt  Pt was worried because she called the [facility] program and program said they did not have any documentation of the prescription change from 1/10/19.    Staff contacted [facility] who said that medication change was noted in system on 1/10/19 and medication was shipped out on 1/15/19  [facility] tracking number [id number]    Pt was given tracking number to track medication from home.

## 2019-01-16 NOTE — TELEPHONE ENCOUNTER
----- Message from Rosi Stewart sent at 1/16/2019  9:21 AM CST -----  Contact: [facility] [[id number]]  Name of Who is Calling: [facility] LE [[id number]]      What is the request in detail: patient would like to speak with nurse regarding medication Orilissa . Please call     Can the clinic reply by [facility]: no    What [facility] Back [facility]: [phone]

## 2019-01-19 NOTE — PROGRESS NOTES
Subjective:       Patient ID: Dominique Huynh is a 23 y.o. female.    Chief Complaint:  Dysmenorrhea and Urinary Tract Infection      History of Present Illness  HPI  Pt is 23 y.o. here in follow up.  Started elagolix and has started to have some relief.  Still taking the lower dose and it will run out soon    Also started to have urinary sx.  Increased frequency.    Finally, still waiting on appt with pelvic floor PT.  GYN & OB History  Patient's last menstrual period was 2018.   Date of Last Pap: 2017    OB History    Para Term  AB Living   0 0 0 0 0 0   SAB TAB Ectopic Multiple Live Births   0 0 0 0               Review of Systems  Review of Systems   Constitutional: Negative for activity change, appetite change and fatigue.   HENT: Negative.  Negative for tinnitus.    Eyes: Negative.    Respiratory: Negative for cough and shortness of breath.    Cardiovascular: Negative for chest pain and palpitations.   Gastrointestinal: Negative.  Negative for abdominal pain, blood in stool, constipation, diarrhea and nausea.   Endocrine: Negative.  Negative for hot flashes.   Genitourinary: Positive for dysuria and pelvic pain. Negative for dyspareunia, frequency, menstrual problem, vaginal discharge, dysmenorrhea, urinary incontinence and postcoital bleeding.   Musculoskeletal: Negative for back pain and joint swelling.   Neurological: Negative.  Negative for headaches.   Hematological: Negative.  Does not bruise/bleed easily.   Psychiatric/Behavioral: The patient is not nervous/anxious.    Breast: negative.  Negative for mass, nipple discharge and skin changes          Objective:    Physical Exam:   Constitutional: She is oriented to person, place, and time. She appears well-developed and well-nourished. No distress.    HENT:   Head: Normocephalic and atraumatic.    Eyes: Conjunctivae and lids are normal. Pupils are equal, round, and reactive to light.    Neck: Normal range of motion and  full passive range of motion without pain. Neck supple.    Cardiovascular: Normal rate and regular rhythm.  Exam reveals no edema.     Pulmonary/Chest: Effort normal and breath sounds normal. She has no wheezes.        Abdominal: Soft. Normal appearance and bowel sounds are normal. She exhibits no distension. There is no tenderness. There is no rebound and no guarding.     Genitourinary: Vagina normal and uterus normal. Pelvic exam was performed with patient supine. There is no tenderness or lesion on the right labia. There is no tenderness or lesion on the left labia. Uterus is not deviated, not fixed and not hosting fibroids. Cervix is normal. Right adnexum displays no mass and no tenderness. Left adnexum displays no mass and no tenderness. No rectocele, cystocele or unspecified prolapse of vaginal walls in the vagina. No vaginal discharge found. Labial bartholins normal.Cervix exhibits no motion tenderness and no discharge.   Genitourinary Comments: Tight vaginal band c/w levator spasm.           Musculoskeletal: Normal range of motion and moves all extremeties.       Neurological: She is alert and oriented to person, place, and time. She has normal strength.    Skin: Skin is warm and dry.    Psychiatric: She has a normal mood and affect. Her speech is normal and behavior is normal. Thought content normal. Her mood appears not anxious. She does not exhibit a depressed mood. She expresses no suicidal plans and no homicidal plans.          Assessment:        1. Levator spasm    2. Endometriosis of pelvis    3. Benign essential microscopic hematuria                Plan:      Dominique was seen today for dysmenorrhea and urinary tract infection.    Diagnoses and all orders for this visit:    Levator spasm  -     cyclobenzaprine (FLEXERIL) 10 MG tablet; Take 1 tablet (10 mg total) by mouth 3 (three) times daily as needed for Muscle spasms.  PT scheduled in 2/2019.  Will start flexeril to see if that can help take the  edge of her spasm.    Endometriosis of pelvis  -     elagolix 200 mg Tab; Take 1 tablet (200 mg) by mouth 2 (two) times daily.  Increased to bid dosing.  Can only use x 6 months.    Benign essential microscopic hematuria  -     Urine culture  tx if positive.

## 2019-02-12 ENCOUNTER — OFFICE VISIT (OUTPATIENT)
Dept: OBSTETRICS AND GYNECOLOGY | Facility: CLINIC | Age: 24
End: 2019-02-12
Payer: COMMERCIAL

## 2019-02-12 VITALS
BODY MASS INDEX: 23.4 KG/M2 | WEIGHT: 127.19 LBS | SYSTOLIC BLOOD PRESSURE: 114 MMHG | HEIGHT: 62 IN | DIASTOLIC BLOOD PRESSURE: 76 MMHG

## 2019-02-12 DIAGNOSIS — N39.0 UTI (URINARY TRACT INFECTION), UNCOMPLICATED: ICD-10-CM

## 2019-02-12 DIAGNOSIS — R35.0 URINARY FREQUENCY: ICD-10-CM

## 2019-02-12 DIAGNOSIS — M62.838 LEVATOR SPASM: ICD-10-CM

## 2019-02-12 PROCEDURE — 87086 URINE CULTURE/COLONY COUNT: CPT

## 2019-02-12 PROCEDURE — 99999 PR PBB SHADOW E&M-EST. PATIENT-LVL III: CPT | Mod: PBBFAC,,, | Performed by: OBSTETRICS & GYNECOLOGY

## 2019-02-12 PROCEDURE — 99214 PR OFFICE/OUTPT VISIT, EST, LEVL IV, 30-39 MIN: ICD-10-PCS | Mod: S$GLB,,, | Performed by: OBSTETRICS & GYNECOLOGY

## 2019-02-12 PROCEDURE — 99999 PR PBB SHADOW E&M-EST. PATIENT-LVL III: ICD-10-PCS | Mod: PBBFAC,,, | Performed by: OBSTETRICS & GYNECOLOGY

## 2019-02-12 PROCEDURE — 3008F BODY MASS INDEX DOCD: CPT | Mod: CPTII,S$GLB,, | Performed by: OBSTETRICS & GYNECOLOGY

## 2019-02-12 PROCEDURE — 3008F PR BODY MASS INDEX (BMI) DOCUMENTED: ICD-10-PCS | Mod: CPTII,S$GLB,, | Performed by: OBSTETRICS & GYNECOLOGY

## 2019-02-12 PROCEDURE — 99214 OFFICE O/P EST MOD 30 MIN: CPT | Mod: S$GLB,,, | Performed by: OBSTETRICS & GYNECOLOGY

## 2019-02-12 RX ORDER — HYDROCODONE BITARTRATE AND ACETAMINOPHEN 5; 325 MG/1; MG/1
1 TABLET ORAL EVERY 6 HOURS PRN
Qty: 20 TABLET | Refills: 0 | Status: SHIPPED | OUTPATIENT
Start: 2019-02-12 | End: 2019-02-22

## 2019-02-12 NOTE — PROGRESS NOTES
Subjective:       Patient ID: Dominique Huynh is a 23 y.o. female.    Chief Complaint:  Pelvic Pain      History of Present Illness  HPI  Pt is 23 y.o. here in follow up.  Still having cramping pelvic pain.  Has increased orlissa dose recently.  Pain is middle, lower.    Has pt appt at end of feb.  Still nervous about using a vaginal suppository (can't use tampons b/c of pain).    GYN & OB History  No LMP recorded. Patient is not currently having periods (Reason: Birth Control).   Date of Last Pap: 2017    OB History    Para Term  AB Living   0 0 0 0 0 0   SAB TAB Ectopic Multiple Live Births   0 0 0 0               Review of Systems  Review of Systems   Constitutional: Negative for activity change, appetite change and fatigue.   HENT: Negative.  Negative for tinnitus.    Eyes: Negative.    Respiratory: Negative for cough and shortness of breath.    Cardiovascular: Negative for chest pain and palpitations.   Gastrointestinal: Negative.  Negative for abdominal pain, blood in stool, constipation, diarrhea and nausea.   Endocrine: Negative.  Negative for hot flashes.   Genitourinary: Positive for pelvic pain. Negative for dysmenorrhea, dyspareunia, dysuria, frequency, menstrual problem, vaginal discharge, urinary incontinence and postcoital bleeding.   Musculoskeletal: Negative for back pain and joint swelling.   Neurological: Negative.  Negative for headaches.   Hematological: Negative.  Does not bruise/bleed easily.   Psychiatric/Behavioral: The patient is not nervous/anxious.    Breast: negative.  Negative for mass, nipple discharge and skin changes          Objective:    Physical Exam:   Constitutional: She is oriented to person, place, and time. She appears well-developed and well-nourished. No distress.    HENT:   Head: Normocephalic and atraumatic.    Eyes: Conjunctivae and lids are normal. Pupils are equal, round, and reactive to light.    Neck: Normal range of motion and full  passive range of motion without pain. Neck supple.    Cardiovascular: Normal rate and regular rhythm.  Exam reveals no edema.     Pulmonary/Chest: Effort normal and breath sounds normal. She has no wheezes.        Abdominal: Soft. Normal appearance and bowel sounds are normal. She exhibits no distension. There is no tenderness. There is no rebound and no guarding.     Genitourinary: Vagina normal and uterus normal. Pelvic exam was performed with patient supine. There is no tenderness or lesion on the right labia. There is no tenderness or lesion on the left labia. Uterus is not deviated, not fixed and not hosting fibroids. Cervix is normal. Right adnexum displays no mass and no tenderness. Left adnexum displays no mass and no tenderness. No rectocele, cystocele or unspecified prolapse of vaginal walls in the vagina. No vaginal discharge found. Labial bartholins normal.Cervix exhibits no motion tenderness and no discharge.   Genitourinary Comments: Significant tightening of levator ani complex diffusely.  Elevated muscular tone.           Musculoskeletal: Normal range of motion and moves all extremeties.       Neurological: She is alert and oriented to person, place, and time. She has normal strength.    Skin: Skin is warm and dry.    Psychiatric: She has a normal mood and affect. Her speech is normal and behavior is normal. Thought content normal. Her mood appears not anxious. She does not exhibit a depressed mood. She expresses no suicidal plans and no homicidal plans.          Assessment:        1. Endometriosis of pelvis    2. Urinary frequency    3. Levator spasm                Plan:      Dominique was seen today for pelvic pain.    Diagnoses and all orders for this visit:    Endometriosis of pelvis  -     HYDROcodone-acetaminophen (NORCO) 5-325 mg per tablet; Take 1 tablet by mouth every 6 (six) hours as needed for Pain.  Feel that the bulk of her pain is related to the pelvic muscle tightness.  Don't think  surgery would help the issue she is having.    We rediscussed the use of vaginal suppositories.  Patient is very nervous about this.  Will consider in the future.    A short course of Norco was given to help with advanced pain. Patient is aware that we will not use this on a long-term basis.  Urinary frequency  -     Urine culture    Levator spasm  -     HYDROcodone-acetaminophen (NORCO) 5-325 mg per tablet; Take 1 tablet by mouth every 6 (six) hours as needed for Pain.

## 2019-02-14 LAB — BACTERIA UR CULT: NORMAL

## 2019-02-26 ENCOUNTER — CLINICAL SUPPORT (OUTPATIENT)
Dept: REHABILITATION | Facility: OTHER | Age: 24
End: 2019-02-26
Attending: OBSTETRICS & GYNECOLOGY
Payer: COMMERCIAL

## 2019-02-26 DIAGNOSIS — M62.89 PELVIC FLOOR DYSFUNCTION: ICD-10-CM

## 2019-02-26 DIAGNOSIS — R10.2 PELVIC PAIN: ICD-10-CM

## 2019-02-26 PROCEDURE — 97161 PT EVAL LOW COMPLEX 20 MIN: CPT

## 2019-02-26 PROCEDURE — 97112 NEUROMUSCULAR REEDUCATION: CPT

## 2019-02-26 NOTE — PROGRESS NOTES
[id number]    Date of treatment: 02/26/2019   Time in: 1:10  Time out: 2:05  # Visits: 1  Auth: 20  Referral expiration: 12/31/19  POC expiration: 6/1/19    Outpatient Physical Therapy   Initial Evaluation    Dominique is a 23 y.o. female evaluated on 02/26/2019    Physician:  Arnie Gonsalves MD   Diagnosis:   Encounter Diagnoses   Name Primary?    Pelvic pain     Pelvic floor dysfunction         Treatment ordered: PT    Medical History:   Past Medical History:   Diagnosis Date    Migraines         Surgical History:   Past Surgical History:   Procedure Laterality Date    PELVIC LAPAROSCOPY  2014    Endometriosis        Medications:   Current Outpatient Medications   Medication Sig    elagolix 200 mg Tab Take 1 tablet (200 mg) by mouth 2 (two) times daily.    meloxicam (MOBIC) 15 MG tablet Take 1 tablet (15 mg total) by mouth once daily.     No current facility-administered medications for this visit.        Allergies: Review of patient's allergies indicates:  No Known Allergies   Precautions: universal   OB/GYN History: endometriosis  Bladder/Bowel History: denies       Barriers to Learning: none  Educational/Spiritual/Cultural needs: none  Environmental Barriers: none noted  Abuse/Neglect: no signs  Nutritional Status: well developed well nourished  Fall Risk: none    Subjective     Pt reports that she has endometriosis and her muscles are tense. She has LBP and low abdominal pain. Pt states she has been dealing with this for years but it has become much worse in the past few months. Pt states that when she was dx with endo she had surgery (2014 or '15) and it was found on her ovaries.   Pt states she takes Orilissa, her last cycle was ~ 2 months ago. Pt states she had stabbing pain with her cycle with heavy bleeding. Sometimes she wouldn't bleed at all and would still have pain. Pt began menstruating at 11 years old, started birth control following her surgery.    Officially dx at 17.    Pain: Patient reports 5/10 with 0 being the lowest and 10 being the highest.   Worst is 8/10, with lots of walking/activity     Pain or lack of sensation with vaginal/pelvic exam? Certain positions are worse than others and feels crampy afterwards; feels tight in there  Sexually active? Yes, one male partner    Frequency of Urination: Daytime: 6        Nighttime: 1    Bladder Leakage: No    Frequency of Bowel Movements: 2/day, sometimes it's firm    Do you have difficulty initiating a bowel movement? no    Types/amount of Fluid Intake: water, cranberry juice  Diet: oatmeal and fruit, chicken and rice for lunch; burgers, popeyes  Current Exercise: has been difficult the past few months due to increase in pain, used to go to the gym    Occupation:   Living situation? With mom     Patient's Goals: to feel better, pain relief, to be healthy    Objective     Written and verbal consent given    ABDOMINALS  Scarring: lap sites; significant global myofascial restrictions    Diastasis: none   Abdominal strength: Rectus: WFL     Transverse: palpable, fair isolation    Internal assessment deferred    TREATMENT    Pt received individual neuromuscular reeducation for 15 minutes including:    - Pelvic diaphragm release  - Diaphragmatic breathing with verbal/tactile cueing  - Cat/cow    Education: Instructed on anatomy/physiology of urinary/bowel/reproductive system and PF function using pelvic model; discussed plan of care with patient; instructed in purpose of physical therapy and the  benefits/risks of treatment; instructed in risks of refusing treatment. Patient agreed to treatment plan. Pt was instructed in HEP including diaphragmatic breathing, cat/cow, and legs in a chair; she verbalized understanding and was provided with a handout. Dominique demonstrated and verbalized understanding of all education provided.    Assessment     Dominique is a 23 y.o. female referred to outpatient physical  therapy with a medical diagnosis of endometriosis and levator spasm. Pt presents today with signs and symptoms consistent with referring diagnosis including abdominal restrictions and muscle guarding, expect to find pelvic floor dysfunction. Pt will benefit from physcial therapy services in order to maximize pain free functional independence. The following goals were discussed with the patient and patient is in agreement with them as to be addressed in the treatment plan. Pt was given a HEP as described above. Pt verbally understood the instructions as they were given and demonstrated proper form and technique during therapy. Pt was advise to perform these exercises free of pain and to stop performing them if pain occurs.     Prognosis: good  No educational, cultural, or spiritual needs identified.    History  Co-morbidities and personal factors that may impact the plan of care Examination  Body Structures and Functions, activity limitations and participation restrictions that may impact the plan of care    Clinical Presentation   Co-morbidities:   Endometriosis  LBP    Personal Factors:    Body Regions:   Pelvis, abdomen, trunk    Body Systems:    Musculoskeletal, neuromuscular    Participation Restrictions:        Activity limitations:   Learning and applying knowledge  no deficits    General Tasks and Commands  no deficits    Communication  no deficits    Mobility  no deficits    Self care  no deficits    Domestic Life  no deficits    Interactions/Relationships  intimate relationships    Life Areas  no deficits    Community and Social Life  community life  recreation and leisure         evolving clinical presentation with changing clinical characteristics                      moderate   low  moderate Decision Making/ Complexity Score:  low     GOALS  Short Term Goals: 4 weeks  1. Pt will verbalize improved awareness of PFM activity as palpated by PT in order to improve activity involvement with HEP.  2. Pt will  be independent with restful breathing/diaphragmatic breathing in order to improve central stabilization, relaxation ability, and pain management.  3. Pt will tolerate HEP to improve impairments and independence with ADL's.    Long Term Goals: 12 weeks  1. Pt will report decrease in pain with intercourse in order to display improved pelvic floor range and quality of life.  2. Pt will be able to perform 10 x 10'' kegels in order to improve pelvic health, blood flow, and muscle function.  3. Pt will be independent with HEP and self management.    Plan     Pt will be treated by physical therapy 1 time a week for 1 month and as needed following for 3-4 months for Pt Education, HEP, therapeutic exercises, neuromuscular re-education, therapeutic activity, gait training, manual therapy, and modalities (including SEMG) PRN to achieve established goals.

## 2019-02-28 ENCOUNTER — OFFICE VISIT (OUTPATIENT)
Dept: OBSTETRICS AND GYNECOLOGY | Facility: CLINIC | Age: 24
End: 2019-02-28
Attending: OBSTETRICS & GYNECOLOGY
Payer: COMMERCIAL

## 2019-02-28 VITALS
WEIGHT: 128.31 LBS | HEIGHT: 62 IN | DIASTOLIC BLOOD PRESSURE: 52 MMHG | BODY MASS INDEX: 23.61 KG/M2 | SYSTOLIC BLOOD PRESSURE: 100 MMHG

## 2019-02-28 DIAGNOSIS — R30.0 DYSURIA: Primary | ICD-10-CM

## 2019-02-28 LAB
BILIRUB UR QL STRIP: NEGATIVE
CLARITY UR REFRACT.AUTO: CLEAR
COLOR UR AUTO: YELLOW
GLUCOSE UR QL STRIP: NEGATIVE
HGB UR QL STRIP: ABNORMAL
KETONES UR QL STRIP: NEGATIVE
LEUKOCYTE ESTERASE UR QL STRIP: NEGATIVE
MICROSCOPIC COMMENT: ABNORMAL
NITRITE UR QL STRIP: NEGATIVE
PH UR STRIP: 7 [PH] (ref 5–8)
PROT UR QL STRIP: NEGATIVE
RBC #/AREA URNS AUTO: 5 /HPF (ref 0–4)
SP GR UR STRIP: 1.01 (ref 1–1.03)
SQUAMOUS #/AREA URNS AUTO: 2 /HPF
URN SPEC COLLECT METH UR: ABNORMAL
WBC #/AREA URNS AUTO: 0 /HPF (ref 0–5)

## 2019-02-28 PROCEDURE — 81001 URINALYSIS AUTO W/SCOPE: CPT

## 2019-02-28 PROCEDURE — 99213 OFFICE O/P EST LOW 20 MIN: CPT | Mod: S$GLB,,, | Performed by: OBSTETRICS & GYNECOLOGY

## 2019-02-28 PROCEDURE — 99999 PR PBB SHADOW E&M-EST. PATIENT-LVL III: CPT | Mod: PBBFAC,,, | Performed by: OBSTETRICS & GYNECOLOGY

## 2019-02-28 PROCEDURE — 99213 PR OFFICE/OUTPT VISIT, EST, LEVL III, 20-29 MIN: ICD-10-PCS | Mod: S$GLB,,, | Performed by: OBSTETRICS & GYNECOLOGY

## 2019-02-28 PROCEDURE — 3008F PR BODY MASS INDEX (BMI) DOCUMENTED: ICD-10-PCS | Mod: CPTII,S$GLB,, | Performed by: OBSTETRICS & GYNECOLOGY

## 2019-02-28 PROCEDURE — 87088 URINE BACTERIA CULTURE: CPT

## 2019-02-28 PROCEDURE — 87086 URINE CULTURE/COLONY COUNT: CPT

## 2019-02-28 PROCEDURE — 87147 CULTURE TYPE IMMUNOLOGIC: CPT

## 2019-02-28 PROCEDURE — 99999 PR PBB SHADOW E&M-EST. PATIENT-LVL III: ICD-10-PCS | Mod: PBBFAC,,, | Performed by: OBSTETRICS & GYNECOLOGY

## 2019-02-28 PROCEDURE — 3008F BODY MASS INDEX DOCD: CPT | Mod: CPTII,S$GLB,, | Performed by: OBSTETRICS & GYNECOLOGY

## 2019-02-28 RX ORDER — PHENAZOPYRIDINE HYDROCHLORIDE 200 MG/1
200 TABLET, FILM COATED ORAL 3 TIMES DAILY PRN
Qty: 20 TABLET | Refills: 0 | Status: SHIPPED | OUTPATIENT
Start: 2019-02-28 | End: 2019-04-09

## 2019-02-28 NOTE — PROGRESS NOTES
"  Subjective:       Patient ID: Dominique Huynh is a 23 y.o. female.    Chief Complaint:  Dysuria (c/o possible UTI, lp 5/3/17 wnl) and Urinary Frequency      History of Present Illness  HPI  The patient presents today with complaints of dysuria for the past 2 days. She states this has occurred more frequently recently following intercourse.  She was seen at an urgent care clinic for a UTI about 3-4 weeks ago and was treated with macrobid with improvement in her symptoms. She was seen a few weeks ago with recurrence, but her urine culture did not  Confirm a UTI. It was felt that her symptoms might be related to her Endometriosis . She does report that her endometriosis symptoms have greatly improved on her new medication.    GYN & OB History  No LMP recorded. Patient is not currently having periods (Reason: Birth Control).   Date of Last Pap: 2017    OB History    Para Term  AB Living   0 0 0 0 0 0   SAB TAB Ectopic Multiple Live Births   0 0 0 0               Past Medical History:   Diagnosis Date    Migraines        Past Surgical History:   Procedure Laterality Date    PELVIC LAPAROSCOPY      Endometriosis       Review of Systems  Review of Systems   Genitourinary: Positive for dysuria, frequency and urgency. Negative for dyspareunia.           Objective:      BP (!) 100/52   Ht 5' 2" (1.575 m)   Wt 58.2 kg (128 lb 4.9 oz)   BMI 23.47 kg/m²   Physical Exam:               Genitourinary: Pelvic exam was performed with patient supine.   Genitourinary Comments: PELVIC: Normal external genitalia without lesions.  Normal hair distribution.  Adequate perineal body, normal urethral meatus.  Vagina moist and well rugated without lesions or discharge.  Cervix pink, without lesions, discharge or tenderness.  No significant cystocele or rectocele.  Bimanual exam shows uterus to be normal size, regular, mobile and nontender.  Adnexa without masses or tenderness.  No Parkman tenderness.       "                       Assessment:        1. Dysuria               Plan:      1. Dysuria    - Urine culture  - Urinalysis  - phenazopyridine (PYRIDIUM) 200 MG tablet; Take 1 tablet (200 mg total) by mouth 3 (three) times daily as needed for Pain.  Dispense: 20 tablet; Refill: 0       Follow-up if symptoms worsen or fail to improve.

## 2019-02-28 NOTE — PLAN OF CARE
[id number]    Date of treatment: 02/26/2019   Time in: 1:10  Time out: 2:05  # Visits: 1  Auth: 20  Referral expiration: 12/31/19  POC expiration: 6/1/19    Outpatient Physical Therapy   Initial Evaluation    Dominique is a 23 y.o. female evaluated on 02/26/2019    Physician:  Arnie Gonsalves MD   Diagnosis:   Encounter Diagnoses   Name Primary?    Pelvic pain     Pelvic floor dysfunction         Treatment ordered: PT    Medical History:   Past Medical History:   Diagnosis Date    Migraines         Surgical History:   Past Surgical History:   Procedure Laterality Date    PELVIC LAPAROSCOPY  2014    Endometriosis        Medications:   Current Outpatient Medications   Medication Sig    elagolix 200 mg Tab Take 1 tablet (200 mg) by mouth 2 (two) times daily.    meloxicam (MOBIC) 15 MG tablet Take 1 tablet (15 mg total) by mouth once daily.     No current facility-administered medications for this visit.        Allergies: Review of patient's allergies indicates:  No Known Allergies   Precautions: universal   OB/GYN History: endometriosis  Bladder/Bowel History: denies       Barriers to Learning: none  Educational/Spiritual/Cultural needs: none  Environmental Barriers: none noted  Abuse/Neglect: no signs  Nutritional Status: well developed well nourished  Fall Risk: none    Subjective     Pt reports that she has endometriosis and her muscles are tense. She has LBP and low abdominal pain. Pt states she has been dealing with this for years but it has become much worse in the past few months. Pt states that when she was dx with endo she had surgery (2014 or '15) and it was found on her ovaries.   Pt states she takes Orilissa, her last cycle was ~ 2 months ago. Pt states she had stabbing pain with her cycle with heavy bleeding. Sometimes she wouldn't bleed at all and would still have pain. Pt began menstruating at 11 years old, started birth control following her surgery.    Officially dx at 17.    Pain: Patient reports 5/10 with 0 being the lowest and 10 being the highest.   Worst is 8/10, with lots of walking/activity     Pain or lack of sensation with vaginal/pelvic exam? Certain positions are worse than others and feels crampy afterwards; feels tight in there  Sexually active? Yes, one male partner    Frequency of Urination: Daytime: 6        Nighttime: 1    Bladder Leakage: No    Frequency of Bowel Movements: 2/day, sometimes it's firm    Do you have difficulty initiating a bowel movement? no    Types/amount of Fluid Intake: water, cranberry juice  Diet: oatmeal and fruit, chicken and rice for lunch; burgers, popeyes  Current Exercise: has been difficult the past few months due to increase in pain, used to go to the gym    Occupation:   Living situation? With mom     Patient's Goals: to feel better, pain relief, to be healthy    Objective     Written and verbal consent given    ABDOMINALS  Scarring: lap sites; significant global myofascial restrictions    Diastasis: none   Abdominal strength: Rectus: WFL     Transverse: palpable, fair isolation    Internal assessment deferred    TREATMENT    Pt received individual neuromuscular reeducation for 15 minutes including:    - Pelvic diaphragm release  - Diaphragmatic breathing with verbal/tactile cueing  - Cat/cow    Education: Instructed on anatomy/physiology of urinary/bowel/reproductive system and PF function using pelvic model; discussed plan of care with patient; instructed in purpose of physical therapy and the  benefits/risks of treatment; instructed in risks of refusing treatment. Patient agreed to treatment plan. Pt was instructed in HEP including diaphragmatic breathing, cat/cow, and legs in a chair; she verbalized understanding and was provided with a handout. Dominique demonstrated and verbalized understanding of all education provided.    Assessment     Dominique is a 23 y.o. female referred to outpatient physical  therapy with a medical diagnosis of endometriosis and levator spasm. Pt presents today with signs and symptoms consistent with referring diagnosis including abdominal restrictions and muscle guarding, expect to find pelvic floor dysfunction. Pt will benefit from physcial therapy services in order to maximize pain free functional independence. The following goals were discussed with the patient and patient is in agreement with them as to be addressed in the treatment plan. Pt was given a HEP as described above. Pt verbally understood the instructions as they were given and demonstrated proper form and technique during therapy. Pt was advise to perform these exercises free of pain and to stop performing them if pain occurs.     Prognosis: good  No educational, cultural, or spiritual needs identified.    History  Co-morbidities and personal factors that may impact the plan of care Examination  Body Structures and Functions, activity limitations and participation restrictions that may impact the plan of care    Clinical Presentation   Co-morbidities:   Endometriosis  LBP    Personal Factors:    Body Regions:   Pelvis, abdomen, trunk    Body Systems:    Musculoskeletal, neuromuscular    Participation Restrictions:        Activity limitations:   Learning and applying knowledge  no deficits    General Tasks and Commands  no deficits    Communication  no deficits    Mobility  no deficits    Self care  no deficits    Domestic Life  no deficits    Interactions/Relationships  intimate relationships    Life Areas  no deficits    Community and Social Life  community life  recreation and leisure         evolving clinical presentation with changing clinical characteristics                      moderate   low  moderate Decision Making/ Complexity Score:  low     GOALS  Short Term Goals: 4 weeks  1. Pt will verbalize improved awareness of PFM activity as palpated by PT in order to improve activity involvement with HEP.  2. Pt will  be independent with restful breathing/diaphragmatic breathing in order to improve central stabilization, relaxation ability, and pain management.  3. Pt will tolerate HEP to improve impairments and independence with ADL's.    Long Term Goals: 12 weeks  1. Pt will report decrease in pain with intercourse in order to display improved pelvic floor range and quality of life.  2. Pt will be able to perform 10 x 10'' kegels in order to improve pelvic health, blood flow, and muscle function.  3. Pt will be independent with HEP and self management.    Plan     Pt will be treated by physical therapy 1 time a week for 1 month and as needed following for 3-4 months for Pt Education, HEP, therapeutic exercises, neuromuscular re-education, therapeutic activity, gait training, manual therapy, and modalities (including SEMG) PRN to achieve established goals.

## 2019-03-02 ENCOUNTER — PATIENT MESSAGE (OUTPATIENT)
Dept: OBSTETRICS AND GYNECOLOGY | Facility: CLINIC | Age: 24
End: 2019-03-02

## 2019-03-02 LAB — BACTERIA UR CULT: NORMAL

## 2019-03-04 ENCOUNTER — TELEPHONE (OUTPATIENT)
Dept: OBSTETRICS AND GYNECOLOGY | Facility: CLINIC | Age: 24
End: 2019-03-04

## 2019-03-04 RX ORDER — AMOXICILLIN AND CLAVULANATE POTASSIUM 875; 125 MG/1; MG/1
1 TABLET, FILM COATED ORAL 2 TIMES DAILY
Qty: 14 TABLET | Refills: 0 | Status: SHIPPED | OUTPATIENT
Start: 2019-03-04 | End: 2019-03-11

## 2019-03-04 NOTE — TELEPHONE ENCOUNTER
----- Message from Rosi Stewart sent at 3/4/2019 12:02 PM CST -----  Contact: [facility] [[id number]]  Name of Who is Calling: [facility] [[id number]]    What is the request in detail: Patient would like a call back to discuss results. Please call     Can the clinic reply by [facility]: no    What Number to Call Back if not in [facility]: [phone]

## 2019-03-10 ENCOUNTER — PATIENT MESSAGE (OUTPATIENT)
Dept: OBSTETRICS AND GYNECOLOGY | Facility: CLINIC | Age: 24
End: 2019-03-10

## 2019-03-11 ENCOUNTER — TELEPHONE (OUTPATIENT)
Dept: OBSTETRICS AND GYNECOLOGY | Facility: CLINIC | Age: 24
End: 2019-03-11

## 2019-03-11 ENCOUNTER — PATIENT MESSAGE (OUTPATIENT)
Dept: OBSTETRICS AND GYNECOLOGY | Facility: CLINIC | Age: 24
End: 2019-03-11

## 2019-03-11 DIAGNOSIS — R35.0 URINARY FREQUENCY: Primary | ICD-10-CM

## 2019-03-11 DIAGNOSIS — N39.0 URINARY TRACT INFECTION WITHOUT HEMATURIA, SITE UNSPECIFIED: Primary | ICD-10-CM

## 2019-03-11 DIAGNOSIS — R30.0 DYSURIA: Primary | ICD-10-CM

## 2019-03-11 RX ORDER — HYOSCYAMINE SULFATE 0.125 MG
125 TABLET ORAL EVERY 4 HOURS PRN
Qty: 30 TABLET | Refills: 1 | Status: SHIPPED | OUTPATIENT
Start: 2019-03-11 | End: 2019-04-09

## 2019-03-11 NOTE — TELEPHONE ENCOUNTER
This patient currently undergoing treatment for Endometriosis was treated for UTI(strep by culture) is still having symptoms. Any suggestions/recommendations? Repeat culture?

## 2019-03-11 NOTE — TELEPHONE ENCOUNTER
Pt finished augmentin for UTI today, states she is still having symptoms. Please advise - Mychart message forwarded to you

## 2019-03-11 NOTE — TELEPHONE ENCOUNTER
----- Message from Denise Tenorio sent at 3/11/2019  2:39 PM CDT -----  Name of Who is Calling: [email or web address] [[id number]]    What is the request in detail: This is the pts 3rd message to staff. Please call regarding UTI. Pt asking for another Rx      Can the clinic reply by [facility]:   Yes      What [facility] Back [facility]: [phone]

## 2019-03-12 ENCOUNTER — LAB VISIT (OUTPATIENT)
Dept: LAB | Facility: OTHER | Age: 24
End: 2019-03-12
Attending: OBSTETRICS & GYNECOLOGY

## 2019-03-12 DIAGNOSIS — N39.0 URINARY TRACT INFECTION WITHOUT HEMATURIA, SITE UNSPECIFIED: ICD-10-CM

## 2019-03-12 LAB
BILIRUB UR QL STRIP: NEGATIVE
CLARITY UR: CLEAR
COLOR UR: YELLOW
GLUCOSE UR QL STRIP: NEGATIVE
HGB UR QL STRIP: ABNORMAL
KETONES UR QL STRIP: NEGATIVE
LEUKOCYTE ESTERASE UR QL STRIP: NEGATIVE
MICROSCOPIC COMMENT: ABNORMAL
NITRITE UR QL STRIP: NEGATIVE
PH UR STRIP: 6 [PH] (ref 5–8)
PROT UR QL STRIP: NEGATIVE
RBC #/AREA URNS HPF: 8 /HPF (ref 0–4)
SP GR UR STRIP: 1.02 (ref 1–1.03)
SQUAMOUS #/AREA URNS HPF: 6 /HPF
URN SPEC COLLECT METH UR: ABNORMAL
UROBILINOGEN UR STRIP-ACNC: NEGATIVE EU/DL

## 2019-03-12 PROCEDURE — 81000 URINALYSIS NONAUTO W/SCOPE: CPT

## 2019-03-12 PROCEDURE — 87086 URINE CULTURE/COLONY COUNT: CPT

## 2019-03-13 LAB — BACTERIA UR CULT: NORMAL

## 2019-03-20 ENCOUNTER — CLINICAL SUPPORT (OUTPATIENT)
Dept: REHABILITATION | Facility: OTHER | Age: 24
End: 2019-03-20
Attending: OBSTETRICS & GYNECOLOGY

## 2019-03-20 DIAGNOSIS — R10.2 PELVIC PAIN: ICD-10-CM

## 2019-03-20 DIAGNOSIS — M62.89 PELVIC FLOOR DYSFUNCTION: Primary | ICD-10-CM

## 2019-03-20 PROCEDURE — 97112 NEUROMUSCULAR REEDUCATION: CPT

## 2019-03-20 NOTE — PATIENT INSTRUCTIONS
Pelvic floor activation with diaphragmatic breathin. Lie comfortably and take a few deep breaths to relax completely - relax the legs, inner thighs, gluts, mouth, and jaw. With hands on low belly, take a few belly breaths. Send the breath down into your low belly and feel your belly rise and fall and feel your breath all the way down into your pelvis.     2. Take a deep breath in through your nose.     3. As you exhale (out through the mouth as if blowing through a straw) gently push as if having a bowel movement.      4. Inhale again to relax the muscles. This time as you exhale gently squeeze and lift as if trying not to pee.     5. Repeat for 6 cycles of breath. Pause, rest, relax. Perform for one or two more cycles.

## 2019-03-20 NOTE — PROGRESS NOTES
[id number]   Diagnosis:   Encounter Diagnoses   Name Primary?    Pelvic floor dysfunction Yes    Pelvic pain       Date of start of care: 2/26/19  Date of treatment: 03/20/2019   Time in: 2:10  Time out: 3:00  Total treatment time: 50 minutes  # Visits: 2 (20 auth)  Auth expiration: 12/31/19  POC expiration: 6/1/19  Outpatient Physical Therapy   Daily Note    Subjective     Pt reports she still has pain and is going to the bathroom more often, up to every 30 minutes. She does not have a UTI.    6/10 pain today    Objective     Written and verbal consent provided    VAGINAL PELVIC FLOOR EXAM  EXTERNAL ASSESSMENT  Skin Condition: WNL  Scarring: none  Sensation: WNL  Pain: none  Introitus: WNL   Voluntary Contraction: partial lift  Voluntary Relaxation: drop  Bearing down: present      INTERNAL ASSESSMENT  Pain: tender areas noted as follows: uncomfortable with pressure to bilateral levators, due to increased activity, unable to perform full assessment of individual muscles, minimal ability to move internally  Pt also with decreased mobility of adductors  Sensation: able to localize pressure appropriately, able to distinguish pressure from side to side, and able to feel the difference between lift and drop    Vaginal Vault: stenotic  Muscle Bulk: overactive  Muscle Power: functional weakness (3/5) partial ROM  Muscle Endurance: NA  # Reps To Fatigue: NA    Fast Contractions: NA    Quality of Contraction: slow rise, decreased hold and slow relaxation  Specificity: WNL   Coordination: WNL    TREATMENT    Pt received individual neuromuscular reeducation for 50 minutes including:    - Diaphragmatic breathing with max cueing; pt required cueing to bring her breath into her abdomen and pelvis with good tissue response with cueing and able to feel effect of diaphragm breathing on her pelvic floor musculature  - PFM breathing with intravaginal cueing  - PFM activation with breath  (alternating lengthening and lift with every exhale); with verbal cueing  - Quadruped rocking with breath  - Child's pose    Education: Discussed progression of plan of care with patient; educated pt in activity modification; pt instructed in HEP including diaphragmatic breathing, PFM activation with diaphragmatic breathing, quadruped rocking, and child's pose; pt demonstrated and verbalized understanding and was provided with a handout.    Assessment     Pt tolerated session well without visible adverse effects. Assessment reveals pelvic floor dysfunction with centrally mediated muscle guarding and tightness improving with diaphragm breathing. Pt with good tolerance for internal vaginal assessment without exacerbation of symptoms. Pt will continue to benefit from skilled PT intervention in order to maximize pain free functional independence.     Pt is making good progress towards established goals.  No educational, cultural, or spiritual barriers to learning identified.    GOALS  Short Term Goals: 4 weeks  1. Pt will verbalize improved awareness of PFM activity as palpated by PT in order to improve activity involvement with HEP.  2. Pt will be independent with restful breathing/diaphragmatic breathing in order to improve central stabilization, relaxation ability, and pain management.  3. Pt will tolerate HEP to improve impairments and independence with ADL's.     Long Term Goals: 12 weeks  1. Pt will report decrease in pain with intercourse in order to display improved pelvic floor range and quality of life.  2. Pt will be able to perform 10 x 10'' kegels in order to improve pelvic health, blood flow, and muscle function.  3. Pt will be independent with HEP and self management.    Plan     Continue with established POC, working toward PT goals.

## 2019-03-27 ENCOUNTER — CLINICAL SUPPORT (OUTPATIENT)
Dept: REHABILITATION | Facility: OTHER | Age: 24
End: 2019-03-27
Attending: OPHTHALMOLOGY

## 2019-03-27 DIAGNOSIS — R10.2 PELVIC PAIN: ICD-10-CM

## 2019-03-27 DIAGNOSIS — M62.89 PELVIC FLOOR DYSFUNCTION: Primary | ICD-10-CM

## 2019-03-27 PROCEDURE — 97112 NEUROMUSCULAR REEDUCATION: CPT

## 2019-03-27 NOTE — PROGRESS NOTES
[id number]   Diagnosis:   Encounter Diagnoses   Name Primary?    Pelvic floor dysfunction Yes    Pelvic pain       Date of start of care: 2/26/19  Date of treatment: 03/27/2019   Time in: 9:05  Time out: 10:03  Total treatment time: 58 minutes  # Visits: 3 (20 auth)  Auth expiration: 12/31/19  POC expiration: 6/1/19  Outpatient Physical Therapy   Daily Note    Subjective     Pt reports she woke up with back pain and is having a lot of pelvic pressure. She says that the exercises helped with urinary frequency and she has been having decreased frequency since out last visit.    7-8/10 pain today    Objective     TREATMENT    Pt received individual neuromuscular reeducation for 58 minutes including:    - Diaphragmatic breathing   - OSPF (borad ligament bilaterally, bilateral ovarian fossa, ligamentum teres)  - Instructions in gentle abdominal self massage    Not performed:  - PFM breathing with intravaginal cueing  - PFM activation with breath (alternating lengthening and lift with every exhale); with verbal cueing  - Quadruped rocking with breath  - Child's pose    Education: Discussed progression of plan of care with patient; educated pt in activity modification; pt instructed to continue with diaphragmatic breathing, PFM activation with diaphragmatic breathing, quadruped rocking, and child's pose; added abdominal massage; pt demonstrated and verbalized understanding and was provided with a handout.    Assessment     Pt tolerated session well without visible adverse effects. Pt reports that her PF exercises are helping with frequency and she is compliant. Pt with c/o pelvic pressure today. Pt presents to PT with    pelvic floor dysfunction with muscle guarding and tightness in pelvic and abdominal muscles. Pt will continue to benefit from skilled PT intervention in order to maximize pain free functional independence. Follow up on entire HEP.    Pt is making good progress  towards established goals.  No educational, cultural, or spiritual barriers to learning identified.    GOALS  Short Term Goals: 4 weeks  1. Pt will verbalize improved awareness of PFM activity as palpated by PT in order to improve activity involvement with HEP.  2. Pt will be independent with restful breathing/diaphragmatic breathing in order to improve central stabilization, relaxation ability, and pain management. Met 3/27/19  3. Pt will tolerate HEP to improve impairments and independence with ADL's. Met 3/27/19     Long Term Goals: 12 weeks  1. Pt will report decrease in pain with intercourse in order to display improved pelvic floor range and quality of life.  2. Pt will be able to perform 10 x 10'' kegels in order to improve pelvic health, blood flow, and muscle function.  3. Pt will be independent with HEP and self management.    Plan     Continue with established POC, working toward PT goals.

## 2019-04-09 ENCOUNTER — OFFICE VISIT (OUTPATIENT)
Dept: OBSTETRICS AND GYNECOLOGY | Facility: CLINIC | Age: 24
End: 2019-04-09

## 2019-04-09 VITALS
BODY MASS INDEX: 23.37 KG/M2 | DIASTOLIC BLOOD PRESSURE: 60 MMHG | WEIGHT: 127 LBS | HEIGHT: 62 IN | SYSTOLIC BLOOD PRESSURE: 100 MMHG

## 2019-04-09 DIAGNOSIS — M62.838 LEVATOR SPASM: Primary | ICD-10-CM

## 2019-04-09 PROCEDURE — 99213 PR OFFICE/OUTPT VISIT, EST, LEVL III, 20-29 MIN: ICD-10-PCS | Mod: S$PBB,,, | Performed by: OBSTETRICS & GYNECOLOGY

## 2019-04-09 PROCEDURE — 99999 PR PBB SHADOW E&M-EST. PATIENT-LVL III: ICD-10-PCS | Mod: PBBFAC,,, | Performed by: OBSTETRICS & GYNECOLOGY

## 2019-04-09 PROCEDURE — 99999 PR PBB SHADOW E&M-EST. PATIENT-LVL III: CPT | Mod: PBBFAC,,, | Performed by: OBSTETRICS & GYNECOLOGY

## 2019-04-09 PROCEDURE — 99213 OFFICE O/P EST LOW 20 MIN: CPT | Mod: S$PBB,,, | Performed by: OBSTETRICS & GYNECOLOGY

## 2019-04-09 PROCEDURE — 99213 OFFICE O/P EST LOW 20 MIN: CPT | Mod: PBBFAC | Performed by: OBSTETRICS & GYNECOLOGY

## 2019-04-10 ENCOUNTER — CLINICAL SUPPORT (OUTPATIENT)
Dept: REHABILITATION | Facility: OTHER | Age: 24
End: 2019-04-10
Attending: OBSTETRICS & GYNECOLOGY

## 2019-04-10 DIAGNOSIS — M62.89 PELVIC FLOOR DYSFUNCTION: Primary | ICD-10-CM

## 2019-04-10 DIAGNOSIS — R10.2 PELVIC PAIN: ICD-10-CM

## 2019-04-10 PROCEDURE — 97110 THERAPEUTIC EXERCISES: CPT

## 2019-04-11 NOTE — PROGRESS NOTES
Subjective:       Patient ID: Dominique Huynh is a 23 y.o. female.    Chief Complaint:  Pelvic Pain (follow up - still having some abd. pain and back pain)      History of Present Illness  HPI  Pt is 23 [facility]   She has been doing fairly well.  She still has episodes of pain. But they are less frequent.  Nothing that is consistent.  She has been tolerating the medication well.  She is currently not having a cycle.  She is also having easy access to the medication.    GYN & OB History  No LMP recorded (exact date). (Menstrual status: Other).   Date of Last Pap: 2017    OB History    Para Term  AB Living   0 0 0 0 0 0   SAB TAB Ectopic Multiple Live Births   0 0 0 0         Review of Systems  Review of Systems   Constitutional: Negative for activity change, appetite change and fatigue.   HENT: Negative.  Negative for tinnitus.    Eyes: Negative.    Respiratory: Negative for cough and shortness of breath.    Cardiovascular: Negative for chest pain and palpitations.   Gastrointestinal: Negative.  Negative for abdominal pain, blood in stool, constipation, diarrhea and nausea.   Endocrine: Negative.  Negative for hot flashes.   Genitourinary: Negative for dysmenorrhea, dyspareunia, dysuria, frequency, menstrual problem, pelvic pain, vaginal discharge, urinary incontinence and postcoital bleeding.   Musculoskeletal: Negative for back pain and joint swelling.   Integumentary:  Negative for rash, breast mass, nipple discharge and breast skin changes.   Neurological: Negative.  Negative for headaches.   Hematological: Negative.  Does not bruise/bleed easily.   Psychiatric/Behavioral: The patient is not nervous/anxious.    Breast: negative.  Negative for mass, nipple discharge and skin changes          Objective:    Physical Exam     def   Assessment:        1. Levator spasm    2. Endometriosis of pelvis                Plan:      Dominique was seen today for pelvic  pain.    Diagnoses and all orders for this visit:    Levator spasm  -     Ambulatory consult to Physical Therapy  Since she is doing well with the medication, I would recommend that she continues were another 3 months to complete a full 6 months therapy.  She is also going to continue pelvic for physical therapy as that has been helping.  After she finishes her or list I, we can consider continuous birth control pills with or without Aygestin.    All questions were answered.  I am optimistic that she is doing well.  Would plan on seeing her for a follow-up with her next well-woman exam.  We can start the birth control pills when she completes her therapy  Endometriosis of pelvis  -     Ambulatory consult to Physical Therapy

## 2019-06-27 ENCOUNTER — TELEPHONE (OUTPATIENT)
Dept: OBSTETRICS AND GYNECOLOGY | Facility: CLINIC | Age: 24
End: 2019-06-27

## 2019-06-27 NOTE — TELEPHONE ENCOUNTER
----- Message from Alissa Evans sent at 6/27/2019  4:02 PM CDT -----  Contact: Pt   Name of Who is Calling: [facility] LE [[id number]]    What is the request in detail:Pt is requesting a call back regarding her medicationelagolix 200 mg Tab she states she is almost done with it and is still having cramps ....,,  Please contact to further discuss and advise      Can the clinic reply by [facility]: No     What Number to Call Back [facility] [phone]

## 2019-06-27 NOTE — TELEPHONE ENCOUNTER
Spoke with pt  Pt has about 2 weeks of Orilissa left and has been feeling better, but started having pain again.    Pt will finish out her medication and was scheduled for follow up with Dr Gonsalves.    Pt verbalized understandgn

## 2019-07-01 ENCOUNTER — OFFICE VISIT (OUTPATIENT)
Dept: URGENT CARE | Facility: CLINIC | Age: 24
End: 2019-07-01

## 2019-07-01 VITALS
SYSTOLIC BLOOD PRESSURE: 99 MMHG | TEMPERATURE: 98 F | RESPIRATION RATE: 18 BRPM | DIASTOLIC BLOOD PRESSURE: 68 MMHG | OXYGEN SATURATION: 100 % | HEART RATE: 64 BPM

## 2019-07-01 DIAGNOSIS — M54.9 BACK PAIN, UNSPECIFIED BACK LOCATION, UNSPECIFIED BACK PAIN LATERALITY, UNSPECIFIED CHRONICITY: Primary | ICD-10-CM

## 2019-07-01 DIAGNOSIS — N30.90 CYSTITIS: ICD-10-CM

## 2019-07-01 LAB
BILIRUB UR QL STRIP: NEGATIVE
GLUCOSE UR QL STRIP: NEGATIVE
KETONES UR QL STRIP: NEGATIVE
LEUKOCYTE ESTERASE UR QL STRIP: NEGATIVE
PH, POC UA: 5 (ref 5–8)
POC BLOOD, URINE: NEGATIVE
POC NITRATES, URINE: NEGATIVE
PROT UR QL STRIP: NEGATIVE
SP GR UR STRIP: 1.01 (ref 1–1.03)
UROBILINOGEN UR STRIP-ACNC: NORMAL (ref 0.1–1.1)

## 2019-07-01 PROCEDURE — 99214 OFFICE O/P EST MOD 30 MIN: CPT | Mod: S$GLB,,, | Performed by: FAMILY MEDICINE

## 2019-07-01 PROCEDURE — 87086 URINE CULTURE/COLONY COUNT: CPT

## 2019-07-01 PROCEDURE — 81003 POCT URINALYSIS, DIPSTICK, AUTOMATED, W/O SCOPE: ICD-10-PCS | Mod: QW,S$GLB,, | Performed by: FAMILY MEDICINE

## 2019-07-01 PROCEDURE — 99214 PR OFFICE/OUTPT VISIT, EST, LEVL IV, 30-39 MIN: ICD-10-PCS | Mod: S$GLB,,, | Performed by: FAMILY MEDICINE

## 2019-07-01 PROCEDURE — 81003 URINALYSIS AUTO W/O SCOPE: CPT | Mod: QW,S$GLB,, | Performed by: FAMILY MEDICINE

## 2019-07-01 RX ORDER — CEFDINIR 300 MG/1
300 CAPSULE ORAL 2 TIMES DAILY
Qty: 10 CAPSULE | Refills: 0 | Status: SHIPPED | OUTPATIENT
Start: 2019-07-01 | End: 2019-07-06

## 2019-07-01 NOTE — PROGRESS NOTES
Subjective:       Patient ID: Dominique Huynh is a 23 y.o. female.    Vitals:  temperature is 97.9 °F (36.6 °C). Her blood pressure is 99/68 and her pulse is 64. Her respiration is 18 and oxygen saturation is 100%.     Chief Complaint: Urinary Tract Infection    Abd. Pain  Lower back pain   Freq. Urination  Symptoms started two days ago      Urinary Tract Infection    This is a new problem. The current episode started in the past 7 days. The problem has been gradually worsening. Associated symptoms include frequency. Pertinent negatives include no chills, hematuria, nausea, urgency, vomiting or rash. She has tried increased fluids for the symptoms. The treatment provided no relief.       Constitution: Negative for chills and fever.   Neck: Negative for painful lymph nodes.   Gastrointestinal: Positive for abdominal pain. Negative for nausea and vomiting.   Genitourinary: Positive for frequency. Negative for dysuria, urgency, urine decreased, hematuria, history of kidney stones, painful menstruation, irregular menstruation, missed menses, heavy menstrual bleeding, ovarian cysts, genital trauma, vaginal pain, vaginal discharge, vaginal bleeding, vaginal odor, painful intercourse, genital sore, painful ejaculation and pelvic pain.   Musculoskeletal: Positive for back pain.   Skin: Negative for rash and lesion.   Hematologic/Lymphatic: Negative for swollen lymph nodes.       Objective:      Physical Exam   Constitutional: She is oriented to person, place, and time. She appears well-developed.   HENT:   Head: Normocephalic and atraumatic.   Cardiovascular: Normal rate, regular rhythm and normal heart sounds.   Pulmonary/Chest: Effort normal and breath sounds normal.   Abdominal: Soft. Bowel sounds are normal. There is tenderness (suprapubic).   Genitourinary: Rectal exam shows guaiac negative stool.   Musculoskeletal: Normal range of motion.   Neurological: She is alert and oriented to person, place, and time.    Skin: Skin is warm and dry.   Psychiatric: She has a normal mood and affect.   Nursing note and vitals reviewed.      Assessment:       1. Back pain, unspecified back location, unspecified back pain laterality, unspecified chronicity    2. Cystitis        Plan:         Back pain, unspecified back location, unspecified back pain laterality, unspecified chronicity  -     POCT Urinalysis, Dipstick, Automated, W/O Scope  -     Urine culture    Cystitis  -     cefdinir (OMNICEF) 300 MG capsule; Take 1 capsule (300 mg total) by mouth 2 (two) times daily. for 5 days  Dispense: 10 capsule; Refill: 0          Patient Instructions       Anatomy of the Female Urinary Tract  Your urinary tract helps get rid of urine (your bodys liquid waste). The kidneys collect chemicals and water your body doesnt need. This is turned into urine. Urine travels out of the kidneys through the ureters to the bladder. The bladder holds urine until youre ready to release it. The urethra carries urine from the bladder out of the body. The main sphincter muscle circles the mid-urethra.      Front view of female urinary tract.     Date Last Reviewed: 1/1/2017  © 9213-2311 [employer]. [address] [place] [address]. All rights reserved. This information is not intended as a substitute for professional medical care. Always follow your healthcare professional's instructions.    I have discussed the common causes of urinary tract infections with the patient. Most UTIs are caused by bacteria living on the perineal skin, around the anus and in the vagina in women that ascend up the urethra. Some bacteria are better at causing infection that others because they have pasquale (sort of like little arms) that help them attach to tissue. Factors that may predispose to UTIs include constipation, dry skin as may be seen with low estrogen levels during menopause, vaginitis, use of douches, intrauterine devices, diagram, or spermicides for  contraception, tampons, incontinence pads, tight clothing and clothing that does not breath such as nylon panties or spandex. Many women develop UTIs after intercourse and/or around the time of menses. Anatomic abnormalities of the urinary tract including kidney stones, obstruction of the urinary tract, foreign bodies such as stents or catheters, and incomplete bladder emptying, or prolonged time between voiding (holding urine for hours).   Behavioral changes to help decrease UTI recurrences   -wipe front to back (for females)  -increase water intake (6 to 8 bottles water per day)   -don't hold urine, void every 2 to 3 hours   -prevent constipation (miralax capful daily if necessary) to have a bowel movement daily and keep stools soft  -cut down on caffeine,drink mainly water  -no douching   -void immediately after sexual intercourse  -May use plastic pitcher with warm warm to pour over genital area after voiding and pat dry to cleanse area.   If you use pads, change these every 3-4 hours.             if culture fails to show infection I strongly recommend she follow through to see the urogynecologist. -she agrees to do so

## 2019-07-01 NOTE — PATIENT INSTRUCTIONS
Anatomy of the Female Urinary Tract  Your urinary tract helps get rid of urine (your bodys liquid waste). The kidneys collect chemicals and water your body doesnt need. This is turned into urine. Urine travels out of the kidneys through the ureters to the bladder. The bladder holds urine until youre ready to release it. The urethra carries urine from the bladder out of the body. The main sphincter muscle circles the mid-urethra.      Front view of female urinary tract.     Date Last Reviewed: 1/1/2017 © 2000-2017 [employer]. [address] [place] [id number]. All rights reserved. This information is not intended as a substitute for professional medical care. Always follow your healthcare professional's instructions.    I have discussed the common causes of urinary tract infections with the patient. Most UTIs are caused by bacteria living on the perineal skin, around the anus and in the vagina in women that ascend up the urethra. Some bacteria are better at causing infection that others because they have pasquale (sort of like little arms) that help them attach to tissue. Factors that may predispose to UTIs include constipation, dry skin as may be seen with low estrogen levels during menopause, vaginitis, use of douches, intrauterine devices, diagram, or spermicides for contraception, tampons, incontinence pads, tight clothing and clothing that does not breath such as nylon panties or spandex. Many women develop UTIs after intercourse and/or around the time of menses. Anatomic abnormalities of the urinary tract including kidney stones, obstruction of the urinary tract, foreign bodies such as stents or catheters, and incomplete bladder emptying, or prolonged time between voiding (holding urine for hours).   Behavioral changes to help decrease UTI recurrences   -wipe front to back (for females)  -increase water intake (6 to 8 bottles water per day)   -don't hold urine, void every 2 to 3 hours    -prevent constipation (miralax capful daily if necessary) to have a bowel movement daily and keep stools soft  -cut down on caffeine,drink mainly water  -no douching   -void immediately after sexual intercourse  -May use plastic pitcher with warm warm to pour over genital area after voiding and pat dry to cleanse area.   If you use pads, change these every 3-4 hours.       if culture fails to show infection I strongly recommend she follow through to see the urogynecologist.

## 2019-07-03 LAB
BACTERIA UR CULT: NORMAL
BACTERIA UR CULT: NORMAL

## 2019-07-04 ENCOUNTER — TELEPHONE (OUTPATIENT)
Dept: URGENT CARE | Facility: CLINIC | Age: 24
End: 2019-07-04

## 2019-07-04 NOTE — TELEPHONE ENCOUNTER
----- Message from Angle Avalos DO sent at 7/4/2019  3:14 PM CDT -----  Please notify pt that her urine culture was negative for infection. She may stop the antibiotic.  She should follow up with the urogynecologist as was recommend a few months ago.  Dr Garnett had put in an order for her to see them. She can check with Dr Garnett's office for instructions on how to schedule that.

## 2019-07-05 NOTE — TELEPHONE ENCOUNTER
Spoke with patient in regard to negative urine cx. Instructed to discontinue antibiotic and f/u with specialists. No questions at time of call. [email or web address]

## 2019-07-16 ENCOUNTER — TELEPHONE (OUTPATIENT)
Dept: OBSTETRICS AND GYNECOLOGY | Facility: CLINIC | Age: 24
End: 2019-07-16

## 2019-07-16 NOTE — TELEPHONE ENCOUNTER
----- Message from Lashanda Shepherd sent at 7/16/2019  8:40 AM CDT -----  Contact: Pt  Type:  Patient Returning Call    Who Called: [facility] [[id number]]    Who Left Message for Patient: Adina Rosales    Does the patient know what this is regarding?:rescheduling appointment     Best Call Back Number:[phone]  Additional Information: No

## 2019-07-29 ENCOUNTER — OFFICE VISIT (OUTPATIENT)
Dept: OBSTETRICS AND GYNECOLOGY | Facility: CLINIC | Age: 24
End: 2019-07-29

## 2019-07-29 VITALS
SYSTOLIC BLOOD PRESSURE: 108 MMHG | DIASTOLIC BLOOD PRESSURE: 64 MMHG | BODY MASS INDEX: 23.61 KG/M2 | HEIGHT: 62 IN | WEIGHT: 128.31 LBS

## 2019-07-29 DIAGNOSIS — M62.838 LEVATOR SPASM: ICD-10-CM

## 2019-07-29 PROCEDURE — 99213 OFFICE O/P EST LOW 20 MIN: CPT | Mod: S$PBB,,, | Performed by: OBSTETRICS & GYNECOLOGY

## 2019-07-29 PROCEDURE — 99212 OFFICE O/P EST SF 10 MIN: CPT | Mod: PBBFAC | Performed by: OBSTETRICS & GYNECOLOGY

## 2019-07-29 PROCEDURE — 99213 PR OFFICE/OUTPT VISIT, EST, LEVL III, 20-29 MIN: ICD-10-PCS | Mod: S$PBB,,, | Performed by: OBSTETRICS & GYNECOLOGY

## 2019-07-29 PROCEDURE — 99999 PR PBB SHADOW E&M-EST. PATIENT-LVL II: CPT | Mod: PBBFAC,,, | Performed by: OBSTETRICS & GYNECOLOGY

## 2019-07-29 PROCEDURE — 99999 PR PBB SHADOW E&M-EST. PATIENT-LVL II: ICD-10-PCS | Mod: PBBFAC,,, | Performed by: OBSTETRICS & GYNECOLOGY

## 2019-07-29 RX ORDER — NORETHINDRONE 5 MG/1
5 TABLET ORAL DAILY
Qty: 30 TABLET | Refills: 11 | Status: SHIPPED | OUTPATIENT
Start: 2019-07-29 | End: 2021-09-23

## 2019-07-29 RX ORDER — GABAPENTIN 100 MG/1
100 CAPSULE ORAL 3 TIMES DAILY
Qty: 90 CAPSULE | Refills: 11 | Status: SHIPPED | OUTPATIENT
Start: 2019-07-29 | End: 2019-10-17

## 2019-07-29 NOTE — PROGRESS NOTES
Subjective:       Patient ID: Dominique Huynh is a 23 y.o. female.    Chief Complaint:  Pelvic Pain      History of Present Illness  HPI  Pt is 23 y.o. here in follow up.  Finished her course of orlissa.  Patient states that she still does episodic pain.  They will be days when she wakes up and feels well.  The the other days when she wakes up she is having significant pain.  It is obviously not associated with her menstrual cycle as she has not had a period since she started on the medication.  She still has heaviness and pain with intercourse.  She also feels heaviness and pain when she pushes to have a bowel movement.  Patient is frustrated because she does not remember what it was like to not have pain.    GYN & OB History  No LMP recorded (lmp unknown). (Menstrual status: Other).   Date of Last Pap: 2017    OB History    Para Term  AB Living   0 0 0 0 0 0   SAB TAB Ectopic Multiple Live Births   0 0 0 0         Review of Systems  Review of Systems   Constitutional: Negative for activity change, appetite change and fatigue.   HENT: Negative.  Negative for tinnitus.    Eyes: Negative.    Respiratory: Negative for cough and shortness of breath.    Cardiovascular: Negative for chest pain and palpitations.   Gastrointestinal: Negative.  Negative for abdominal pain, blood in stool, constipation, diarrhea and nausea.   Endocrine: Negative.  Negative for hot flashes.   Genitourinary: Positive for pelvic pain. Negative for dysmenorrhea, dyspareunia, dysuria, frequency, menstrual problem, vaginal discharge, urinary incontinence and postcoital bleeding.   Musculoskeletal: Negative for back pain and joint swelling.   Integumentary:  Negative for rash, breast mass, nipple discharge and breast skin changes.   Neurological: Negative.  Negative for headaches.   Hematological: Negative.  Does not bruise/bleed easily.   Psychiatric/Behavioral: The patient is not nervous/anxious.    Breast: negative.   Negative for mass, nipple discharge and skin changes          Objective:    Physical Exam     def  Assessment:        1. Endometriosis of pelvis    2. Levator spasm                Plan:      Dominique was seen today for pelvic pain.    Diagnoses and all orders for this visit:    Endometriosis of pelvis  -     gabapentin (NEURONTIN) 100 MG capsule; Take 1 capsule (100 mg total) by mouth 3 (three) times daily.  -     norethindrone (AYGESTIN) 5 mg Tab; Take 1 tablet (5 mg total) by mouth once daily.  I had a long discussion with the patient today lasting approximately 35 min.  We discussed the different reasons for her pelvic pain.  It is frustrating because she has not experienced significant relief after physical therapy and the course of medication.  It makes me suspicious that there may be another etiology to her pain. This is usually the time when we discussed surgical diagnosis and excision of any scar tissue.  Her case is complicated because she does not have insurance at this time.  We will start with daily Aygestin and nightly gabapentin to see if that helps with her pain management.  In the interim she will sign a per insurance.  If she is continuing to have pain by the fall, we can consider diagnostic laparoscopy.  All questions were answered.  Levator spasm  -     gabapentin (NEURONTIN) 100 MG capsule; Take 1 capsule (100 mg total) by mouth 3 (three) times daily.

## 2019-09-26 ENCOUNTER — TELEPHONE (OUTPATIENT)
Dept: OBSTETRICS AND GYNECOLOGY | Facility: CLINIC | Age: 24
End: 2019-09-26

## 2019-09-26 ENCOUNTER — OFFICE VISIT (OUTPATIENT)
Dept: OBSTETRICS AND GYNECOLOGY | Facility: CLINIC | Age: 24
End: 2019-09-26
Payer: COMMERCIAL

## 2019-09-26 VITALS
DIASTOLIC BLOOD PRESSURE: 62 MMHG | WEIGHT: 133.19 LBS | SYSTOLIC BLOOD PRESSURE: 112 MMHG | BODY MASS INDEX: 24.35 KG/M2

## 2019-09-26 PROCEDURE — 99214 PR OFFICE/OUTPT VISIT, EST, LEVL IV, 30-39 MIN: ICD-10-PCS | Mod: 57,S$GLB,, | Performed by: OBSTETRICS & GYNECOLOGY

## 2019-09-26 PROCEDURE — 99999 PR PBB SHADOW E&M-EST. PATIENT-LVL II: CPT | Mod: PBBFAC,,, | Performed by: OBSTETRICS & GYNECOLOGY

## 2019-09-26 PROCEDURE — 99999 PR PBB SHADOW E&M-EST. PATIENT-LVL II: ICD-10-PCS | Mod: PBBFAC,,, | Performed by: OBSTETRICS & GYNECOLOGY

## 2019-09-26 PROCEDURE — 99214 OFFICE O/P EST MOD 30 MIN: CPT | Mod: 57,S$GLB,, | Performed by: OBSTETRICS & GYNECOLOGY

## 2019-09-26 RX ORDER — CYCLOBENZAPRINE HCL 10 MG
10 TABLET ORAL 3 TIMES DAILY PRN
COMMUNITY
End: 2021-03-11

## 2019-09-27 NOTE — PROGRESS NOTES
Subjective:       Patient ID: Dominique Huynh is a 24 y.o. female.    Chief Complaint:  Advice Only (ENDO)      History of Present Illness  HPI  Pt is 24 y.o. well known to our practice with a known hx of endometriosis and dyspareunia/CPP.  Pt has been trying aggressive medical tx (ocp's and aygestin) with minimal relief.  Tried pelvic floor PT and it didn't help her much.  Frustrated as this has been going on for about a year with no significant progress.    GYN & OB History  Patient's last menstrual period was 08/15/2019 (approximate).   Date of Last Pap: 2017    OB History    Para Term  AB Living   0 0 0 0 0 0   SAB TAB Ectopic Multiple Live Births   0 0 0 0         Review of Systems  Review of Systems   Constitutional: Negative for activity change, appetite change and fatigue.   HENT: Negative.  Negative for tinnitus.    Eyes: Negative.    Respiratory: Negative for cough and shortness of breath.    Cardiovascular: Negative for chest pain and palpitations.   Gastrointestinal: Negative.  Negative for abdominal pain, blood in stool, constipation, diarrhea and nausea.   Endocrine: Negative.  Negative for hot flashes.   Genitourinary: Positive for pelvic pain. Negative for dysmenorrhea, dyspareunia, dysuria, frequency, menstrual problem, vaginal discharge, urinary incontinence and postcoital bleeding.   Musculoskeletal: Negative for back pain and joint swelling.   Integumentary:  Negative for rash, breast mass, nipple discharge and breast skin changes.   Neurological: Negative.  Negative for headaches.   Hematological: Negative.  Does not bruise/bleed easily.   Psychiatric/Behavioral: The patient is not nervous/anxious.    Breast: negative.  Negative for mass, nipple discharge and skin changes          Objective:    Physical Exam       Assessment:        1. Endometriosis of pelvis                Plan:      Dominique was seen today for advice only.    Diagnoses and all orders for this  visit:    Endometriosis of pelvis    I had a long discussion with pt and mother lasting approx 45 min regarding her situation.  It is now reasonable to consider dx LSC with repeat excision of any endometriosis.  After surgery, can discuss restarting medication like lupron or orlissa.  R/b/i/a of procedure discussed and all questions answered.  She is also aware that there is about a 50% chance that we may not find an obvious source to her pain.

## 2019-10-14 ENCOUNTER — OFFICE VISIT (OUTPATIENT)
Dept: OBSTETRICS AND GYNECOLOGY | Facility: CLINIC | Age: 24
End: 2019-10-14
Payer: COMMERCIAL

## 2019-10-14 VITALS
HEIGHT: 62 IN | BODY MASS INDEX: 24.78 KG/M2 | DIASTOLIC BLOOD PRESSURE: 70 MMHG | SYSTOLIC BLOOD PRESSURE: 118 MMHG | WEIGHT: 134.69 LBS

## 2019-10-14 PROCEDURE — 99499 NO LOS: ICD-10-PCS | Mod: S$GLB,,, | Performed by: OBSTETRICS & GYNECOLOGY

## 2019-10-14 PROCEDURE — 99999 PR PBB SHADOW E&M-EST. PATIENT-LVL II: CPT | Mod: PBBFAC,,, | Performed by: OBSTETRICS & GYNECOLOGY

## 2019-10-14 PROCEDURE — 99999 PR PBB SHADOW E&M-EST. PATIENT-LVL II: ICD-10-PCS | Mod: PBBFAC,,, | Performed by: OBSTETRICS & GYNECOLOGY

## 2019-10-14 PROCEDURE — 99499 UNLISTED E&M SERVICE: CPT | Mod: S$GLB,,, | Performed by: OBSTETRICS & GYNECOLOGY

## 2019-10-14 NOTE — LETTER
October 14, 2019    Dominique Huynh  [address] [facility] [address]         [facility] [id number] [address]  [address] [facility] [address]  Phone: [phone] October 14, 2019     Patient: Dominique Huynh   YOB: 1995   Date of Visit: 10/14/2019       To Whom It May Concern:    It is my medical opinion that Dominique Huynh should remain out of work until 3 weeks after surgery (surgery is scheduled for 10/24/2019).  .    If you have any questions or concerns, please don't hesitate to call.    Sincerely,        Arnie Gonsalves MD

## 2019-10-14 NOTE — PROGRESS NOTES
Subjective:       Patient ID: Dominique Huynh is a 24 y.o. female.    Chief Complaint:  Pre-op Exam      History of Present Illness  HPI  Pt is 24 y.o. here in preop.  Scheduled for Dx LSC, excision of endo.  Pt with known hx of stage 2 endometriosis.  Pain seems R>L.    GYN & OB History  Patient's last menstrual period was 2019 (exact date).   Date of Last Pap: 2017    OB History    Para Term  AB Living   0 0 0 0 0 0   SAB TAB Ectopic Multiple Live Births   0 0 0 0         Review of Systems  Review of Systems   Constitutional: Negative for activity change, appetite change and fatigue.   HENT: Negative.  Negative for tinnitus.    Eyes: Negative.    Respiratory: Negative for cough and shortness of breath.    Cardiovascular: Negative for chest pain and palpitations.   Gastrointestinal: Negative.  Negative for abdominal pain, blood in stool, constipation, diarrhea and nausea.   Endocrine: Negative.  Negative for hot flashes.   Genitourinary: Positive for pelvic pain. Negative for dysmenorrhea, dyspareunia, dysuria, frequency, menstrual problem, vaginal discharge, urinary incontinence and postcoital bleeding.   Musculoskeletal: Negative for back pain and joint swelling.   Integumentary:  Negative for rash, breast mass, nipple discharge and breast skin changes.   Neurological: Negative.  Negative for headaches.   Hematological: Negative.  Does not bruise/bleed easily.   Psychiatric/Behavioral: The patient is not nervous/anxious.    Breast: negative.  Negative for mass, nipple discharge and skin changes          Objective:    Physical Exam:   Constitutional: She is oriented to person, place, and time. She appears well-developed and well-nourished. No distress.    HENT:   Head: Normocephalic and atraumatic.    Eyes: Pupils are equal, round, and reactive to light. Conjunctivae and lids are normal.    Neck: Normal range of motion and full passive range of motion without pain. Neck supple.     Cardiovascular: Normal rate and regular rhythm.  Exam reveals no edema.     Pulmonary/Chest: Effort normal and breath sounds normal. She has no wheezes.        Abdominal: Soft. Normal appearance and bowel sounds are normal. She exhibits no distension. There is no tenderness. There is no rebound and no guarding.             Musculoskeletal: Normal range of motion and moves all extremeties.       Neurological: She is alert and oriented to person, place, and time. She has normal strength.    Skin: Skin is warm and dry.    Psychiatric: She has a normal mood and affect. Her speech is normal and behavior is normal. Thought content normal. Her mood appears not anxious. She does not exhibit a depressed mood. She expresses no suicidal plans and no homicidal plans.          Assessment:        1. Endometriosis of pelvis                Plan:      Dominique was seen today for pre-op exam.    Diagnoses and all orders for this visit:    Endometriosis of pelvis  -     Vital signs; Standing  -     Notify Physician/Vital Signs Parameters Urine output less than 0.5mL/kg/hr (with indwelling catheter) or 30 mL/hr (without indwelling catheter) or blood glucose greater than 200 mg/dL; Standing  -     Notify physician; Standing  -     Notify Physician - Potential Need of Opioid Reversal; Standing  -     Diet NPO; Standing  -     Full code; Standing  -     Place in Outpatient; Standing  -     Place sequential compression device; Standing  -     Pregnancy, urine rapid; Standing    Factual information regarding the medical condition and the need for Dx. LSC, Excision of Endometriosis was discussed with the patient, who verbalized understanding. The therapeutic rationale, benefits, risks and potential complications were discussed, including the possibility of pain, bleeding, infection, loss of function, disfigurement, death or other unforeseen complications. Alternatives to the procedure were discussed (including potential risks,  complications and benefits of each). No guarantee was expressed or implied as to the results of the procedure. Informed consent was given, as well as a request to proceed.    Other orders  -     IP VTE LOW RISK PATIENT; Standing  -     Ambulate; Standing

## 2019-10-25 ENCOUNTER — TELEPHONE (OUTPATIENT)
Dept: OBSTETRICS AND GYNECOLOGY | Facility: CLINIC | Age: 24
End: 2019-10-25

## 2019-10-25 ENCOUNTER — PATIENT MESSAGE (OUTPATIENT)
Dept: OBSTETRICS AND GYNECOLOGY | Facility: CLINIC | Age: 24
End: 2019-10-25

## 2019-10-25 DIAGNOSIS — R35.0 FREQUENT URINATION: Primary | ICD-10-CM

## 2019-10-25 RX ORDER — PHENAZOPYRIDINE HYDROCHLORIDE 200 MG/1
200 TABLET, FILM COATED ORAL 3 TIMES DAILY PRN
Qty: 20 TABLET | Refills: 0 | Status: SHIPPED | OUTPATIENT
Start: 2019-10-25 | End: 2019-12-10

## 2019-10-25 NOTE — TELEPHONE ENCOUNTER
Spoke to pt  Pt was having some bladder spasms.  Pt started medication that Dr Gonsalves sent over today and is feeling better.  Pt is reporting passing gas, has not have a BM and is taking pain meds.  Pt is able to urinate,  Pt denies any fever, vomiting, chills, discharge, bleeding or signs of infection.  Per Dr Gonsalves, pt instructed to take colace and increase fluids and eat a diet high in fiber to help with constipation.    Pt scheduled for post op.    Pt verbalized understanding.

## 2019-10-25 NOTE — TELEPHONE ENCOUNTER
Pt messaging that following her surgery yesterday, she is having to urinate every hour or less.    Per Dr Gonsalves, Pyridium sent to Pharm on [address].

## 2019-10-31 ENCOUNTER — PATIENT MESSAGE (OUTPATIENT)
Dept: OBSTETRICS AND GYNECOLOGY | Facility: CLINIC | Age: 24
End: 2019-10-31

## 2019-11-07 ENCOUNTER — PATIENT MESSAGE (OUTPATIENT)
Dept: OBSTETRICS AND GYNECOLOGY | Facility: CLINIC | Age: 24
End: 2019-11-07

## 2019-11-07 ENCOUNTER — OFFICE VISIT (OUTPATIENT)
Dept: OBSTETRICS AND GYNECOLOGY | Facility: CLINIC | Age: 24
End: 2019-11-07
Payer: COMMERCIAL

## 2019-11-07 VITALS
DIASTOLIC BLOOD PRESSURE: 60 MMHG | SYSTOLIC BLOOD PRESSURE: 100 MMHG | WEIGHT: 132.25 LBS | HEIGHT: 62 IN | BODY MASS INDEX: 24.34 KG/M2

## 2019-11-07 DIAGNOSIS — Z09 POSTOPERATIVE EXAMINATION: Primary | ICD-10-CM

## 2019-11-07 DIAGNOSIS — Z97.5 CONTRACEPTION, DEVICE INTRAUTERINE: ICD-10-CM

## 2019-11-07 PROCEDURE — 99024 POSTOP FOLLOW-UP VISIT: CPT | Mod: S$GLB,,, | Performed by: OBSTETRICS & GYNECOLOGY

## 2019-11-07 PROCEDURE — 99999 PR PBB SHADOW E&M-EST. PATIENT-LVL II: ICD-10-PCS | Mod: PBBFAC,,, | Performed by: OBSTETRICS & GYNECOLOGY

## 2019-11-07 PROCEDURE — 99024 PR POST-OP FOLLOW-UP VISIT: ICD-10-PCS | Mod: S$GLB,,, | Performed by: OBSTETRICS & GYNECOLOGY

## 2019-11-07 PROCEDURE — 99999 PR PBB SHADOW E&M-EST. PATIENT-LVL II: CPT | Mod: PBBFAC,,, | Performed by: OBSTETRICS & GYNECOLOGY

## 2019-11-07 NOTE — PROGRESS NOTES
"CC: Postop visit    Dominique Huynh is a 24 y.o. female  post-op from a Dx LSC, excision of endo on 10/24/19.  Patient is doing well postoperatively.  No pain.  No bowel or bladder complaints.  No fever.      The pathology revealed:  endo    Past Medical History:   Diagnosis Date    Endometriosis     Migraines      Past Surgical History:   Procedure Laterality Date    DIAGNOSTIC LAPAROSCOPY Bilateral 10/24/2019    Procedure: LAPAROSCOPY, DIAGNOSTIC;  Surgeon: Arnei Gonsalves MD;  Location: [facility] OR;  Service: OB/GYN;  Laterality: Bilateral;  ROD[facility]IGUE[facility] SUR[facility] confirmed 10/15/dme    PELVIC LAPAROSCOPY      Endometriosis    SURGICAL REMOVAL OF ENDOMETRIOSIS N/A 10/24/2019    Procedure: DESTRUCTION, ENDOMETRIOSIS;  Surgeon: Arnie Gonsalves MD;  Location: [facility] OR;  Service: OB/GYN;  Laterality: N/A;     Family History   Problem Relation Age of Onset    Diabetes Maternal Aunt     Miscarriages / Stillbirths Mother     Diabetes Father     Breast cancer Neg Hx     Colon cancer Neg Hx     Ovarian cancer Neg Hx      Social History     Tobacco Use    Smoking status: Never Smoker    Smokeless tobacco: Never Used   Substance Use Topics    Alcohol use: Yes     Comment: Occasionally.    Drug use: No     OB History    Para Term  AB Living   0 0 0 0 0 0   SAB TAB Ectopic Multiple Live Births   0 0 0 0         /60   Ht 5' 2" (1.575 m)   Wt 60 kg (132 lb 4.4 oz)   BMI 24.19 kg/m²     ROS:  GENERAL: No fever, chills, fatigability or weight loss.  VULVAR: No pain, no lesions and no itching.  VAGINAL: No relaxation, no itching, no discharge, no abnormal bleeding and no lesions.  ABDOMEN: No abdominal pain. Denies nausea. Denies vomiting. No diarrhea. No constipation  BREAST: Denies pain. No lumps. No discharge.  URINARY: No incontinence, no nocturia, no frequency and no dysuria.  CARDIOVASCULAR: No chest pain. No shortness of breath. No leg cramps.  NEUROLOGICAL: No headaches. No vision " changes.    PE:   External genitalia: normal general appearance  Incision c/d/i        ICD-10-CM ICD-9-CM    1. Postoperative examination Z09 V67.00    2. Contraception, device intrauterine Z97.5 V45.51 Device Authorization Order         Follow up in 1 year for WWE.  We discussed different tx for endo.  Pt is currently taking Aygestin.  We also discussed the use of Mirena.  Patient was given information about the IUD.  She will consider if she wants to try that.  In the interim she will take daily Aygestin.  If her pain is controlled she can notify our office.

## 2019-11-21 ENCOUNTER — TELEPHONE (OUTPATIENT)
Dept: OBSTETRICS AND GYNECOLOGY | Facility: CLINIC | Age: 24
End: 2019-11-21

## 2019-11-21 NOTE — TELEPHONE ENCOUNTER
Spoke with pt  Per Dr Gonsalves: Aygestin 5mg daily is good daily treatment.  And she can use flexeril as needed.    Pt verbalized understanding.

## 2019-11-21 NOTE — TELEPHONE ENCOUNTER
Spoke with pt  Pt has declined not to get a IUD.  Pt wants to know if Dr Gonsalves would like to keep her on Aygestin 5mg or is there something better she can be on.    Pt states she is having some mild cramping still following surgery and is taking the flexeril as needed.  Pt told Dr Gonsalves is in surgery today and will forward message to him.    Pt verbalized understanding.

## 2019-11-21 NOTE — TELEPHONE ENCOUNTER
----- Message from Natalie Bear sent at 11/21/2019  1:25 PM CST -----  Contact: self  [phone]. Pt does not a iud anymore but wants to know if dr wants her to stay on meds she is taking now or does he want to put her on something else

## 2019-11-21 NOTE — TELEPHONE ENCOUNTER
If pt does not want to use the IUD, that is reasonable.  Aygestin 5mg daily is good daily treatment.  And she can use flexeril as needed.

## 2019-11-22 ENCOUNTER — TELEPHONE (OUTPATIENT)
Dept: OBSTETRICS AND GYNECOLOGY | Facility: CLINIC | Age: 24
End: 2019-11-22

## 2019-11-22 DIAGNOSIS — N94.6 DYSMENORRHEA: ICD-10-CM

## 2019-11-22 RX ORDER — DESOGESTREL AND ETHINYL ESTRADIOL 0.15-0.03
1 KIT ORAL DAILY
Qty: 84 TABLET | Refills: 5 | Status: SHIPPED | OUTPATIENT
Start: 2019-11-22 | End: 2019-12-10

## 2019-11-22 NOTE — TELEPHONE ENCOUNTER
----- Message from Alissa Chiner sent at 11/22/2019  3:37 PM CST -----  Contact: Pt   Name of Who is Calling: [facility] [[id number]]    What is the request in detail: [facility] [[id number]] is requesting a call back regards to the pt is bleeding j....Please contact to further discuss and advise      Can the clinic reply by [facility]: Yes     [facility]  [phone] (home)

## 2019-11-22 NOTE — TELEPHONE ENCOUNTER
Spoke with pt  Pt is calling complaining of bleeding and cramping.    Pt reports bleeding like her period with cramps and is on her way to work.  Pt has started her cycle.  Pt given bleeding and pain precautions.  Pt denies wanting a IUD for BC and now desires OCP's.  Pt is taking the Aygestin 5mg tablets as directed for her endometriosis.    Message sent to Dr Gonsalves for OCP refill.    Per Dr Gonsalves, pt should use condom as a back up form of contraception while only using there Aygestin.    Pt verbalized understain.

## 2019-11-26 ENCOUNTER — PATIENT MESSAGE (OUTPATIENT)
Dept: OBSTETRICS AND GYNECOLOGY | Facility: CLINIC | Age: 24
End: 2019-11-26

## 2019-12-01 ENCOUNTER — PATIENT MESSAGE (OUTPATIENT)
Dept: OBSTETRICS AND GYNECOLOGY | Facility: CLINIC | Age: 24
End: 2019-12-01

## 2019-12-01 DIAGNOSIS — N94.6 DYSMENORRHEA: Primary | ICD-10-CM

## 2019-12-02 ENCOUNTER — PATIENT MESSAGE (OUTPATIENT)
Dept: OBSTETRICS AND GYNECOLOGY | Facility: CLINIC | Age: 24
End: 2019-12-02

## 2019-12-02 RX ORDER — KETOROLAC TROMETHAMINE 10 MG/1
10 TABLET, FILM COATED ORAL EVERY 6 HOURS PRN
Qty: 20 TABLET | Refills: 0 | Status: SHIPPED | OUTPATIENT
Start: 2019-12-02 | End: 2020-02-06

## 2019-12-09 ENCOUNTER — PATIENT MESSAGE (OUTPATIENT)
Dept: OBSTETRICS AND GYNECOLOGY | Facility: CLINIC | Age: 24
End: 2019-12-09

## 2019-12-09 DIAGNOSIS — N94.6 DYSMENORRHEA: ICD-10-CM

## 2019-12-10 ENCOUNTER — PATIENT MESSAGE (OUTPATIENT)
Dept: OBSTETRICS AND GYNECOLOGY | Facility: CLINIC | Age: 24
End: 2019-12-10

## 2019-12-10 RX ORDER — DROSPIRENONE AND ETHINYL ESTRADIOL 0.02-3(28)
1 KIT ORAL DAILY
Qty: 84 TABLET | Refills: 4 | Status: SHIPPED | OUTPATIENT
Start: 2019-12-10 | End: 2020-01-08

## 2019-12-17 ENCOUNTER — PATIENT MESSAGE (OUTPATIENT)
Dept: OBSTETRICS AND GYNECOLOGY | Facility: CLINIC | Age: 24
End: 2019-12-17

## 2019-12-17 DIAGNOSIS — M62.89 PELVIC FLOOR DYSFUNCTION: Primary | ICD-10-CM

## 2019-12-18 ENCOUNTER — TELEPHONE (OUTPATIENT)
Dept: OBSTETRICS AND GYNECOLOGY | Facility: CLINIC | Age: 24
End: 2019-12-18

## 2019-12-18 ENCOUNTER — PATIENT MESSAGE (OUTPATIENT)
Dept: OBSTETRICS AND GYNECOLOGY | Facility: CLINIC | Age: 24
End: 2019-12-18

## 2019-12-18 RX ORDER — GUAIFENESIN AND PHENYLEPHRINE HCL 400; 10 MG/1; MG/1
1 TABLET ORAL 2 TIMES DAILY
Qty: 60 CAPSULE | Refills: 3 | Status: SHIPPED | OUTPATIENT
Start: 2019-12-18 | End: 2020-01-17

## 2019-12-18 RX ORDER — TRAMADOL HYDROCHLORIDE 50 MG/1
50 TABLET ORAL EVERY 6 HOURS
Qty: 28 TABLET | Refills: 0 | Status: SHIPPED | OUTPATIENT
Start: 2019-12-18 | End: 2020-02-06

## 2019-12-27 ENCOUNTER — PATIENT MESSAGE (OUTPATIENT)
Dept: FAMILY MEDICINE | Facility: CLINIC | Age: 24
End: 2019-12-27

## 2020-01-08 ENCOUNTER — PATIENT MESSAGE (OUTPATIENT)
Dept: OBSTETRICS AND GYNECOLOGY | Facility: CLINIC | Age: 25
End: 2020-01-08

## 2020-01-08 RX ORDER — NORETHINDRONE ACETATE AND ETHINYL ESTRADIOL AND FERROUS FUMARATE 1MG-20(24)
1 KIT ORAL DAILY
Qty: 84 TABLET | Refills: 4 | Status: SHIPPED | OUTPATIENT
Start: 2020-01-08 | End: 2021-01-15 | Stop reason: SDUPTHER

## 2020-02-06 ENCOUNTER — OFFICE VISIT (OUTPATIENT)
Dept: OBSTETRICS AND GYNECOLOGY | Facility: CLINIC | Age: 25
End: 2020-02-06
Payer: COMMERCIAL

## 2020-02-06 VITALS
HEIGHT: 62 IN | DIASTOLIC BLOOD PRESSURE: 70 MMHG | SYSTOLIC BLOOD PRESSURE: 98 MMHG | WEIGHT: 137.56 LBS | BODY MASS INDEX: 25.32 KG/M2

## 2020-02-06 DIAGNOSIS — M62.89 PELVIC FLOOR DYSFUNCTION: Primary | ICD-10-CM

## 2020-02-06 PROCEDURE — 99999 PR PBB SHADOW E&M-EST. PATIENT-LVL III: ICD-10-PCS | Mod: PBBFAC,,, | Performed by: OBSTETRICS & GYNECOLOGY

## 2020-02-06 PROCEDURE — 99214 PR OFFICE/OUTPT VISIT, EST, LEVL IV, 30-39 MIN: ICD-10-PCS | Mod: S$GLB,,, | Performed by: OBSTETRICS & GYNECOLOGY

## 2020-02-06 PROCEDURE — 99214 OFFICE O/P EST MOD 30 MIN: CPT | Mod: S$GLB,,, | Performed by: OBSTETRICS & GYNECOLOGY

## 2020-02-06 PROCEDURE — 99999 PR PBB SHADOW E&M-EST. PATIENT-LVL III: CPT | Mod: PBBFAC,,, | Performed by: OBSTETRICS & GYNECOLOGY

## 2020-02-11 NOTE — PROGRESS NOTES
Subjective:       Patient ID: Dominique Huynh is a 24 y.o. female.    Chief Complaint:  Pelvic Pain      History of Present Illness  HPI  Pt is 24 y.o. here in follow up for pelvic pain.  Having significant issue with pain with intercourse.  Has been using the medications as prescribed.  Pain relief seems to have leveled off.  Has pain with BM.  No dysuria.  Having difficulty with even insertion during intercourse.    GYN & OB History  Patient's last menstrual period was 01/10/2020.   Date of Last Pap: 2017    OB History    Para Term  AB Living   0 0 0 0 0 0   SAB TAB Ectopic Multiple Live Births   0 0 0 0         Review of Systems  Review of Systems   Constitutional: Negative for activity change, appetite change and fatigue.   HENT: Negative.  Negative for tinnitus.    Eyes: Negative.    Respiratory: Negative for cough and shortness of breath.    Cardiovascular: Negative for chest pain and palpitations.   Gastrointestinal: Negative.  Negative for abdominal pain, blood in stool, constipation, diarrhea and nausea.   Endocrine: Negative.  Negative for hot flashes.   Genitourinary: Positive for dyspareunia and pelvic pain. Negative for dysmenorrhea, dysuria, frequency, menstrual problem, vaginal discharge, urinary incontinence and postcoital bleeding.   Musculoskeletal: Negative for back pain and joint swelling.   Integumentary:  Negative for rash, breast mass, nipple discharge and breast skin changes.   Neurological: Negative.  Negative for headaches.   Hematological: Negative.  Does not bruise/bleed easily.   Psychiatric/Behavioral: The patient is not nervous/anxious.    Breast: negative.  Negative for mass, nipple discharge and skin changes          Objective:    Physical Exam:   Constitutional: She is oriented to person, place, and time. She appears well-developed and well-nourished. No distress.    HENT:   Head: Normocephalic and atraumatic.    Eyes: Pupils are equal, round, and  reactive to light. Conjunctivae and lids are normal.    Neck: Normal range of motion and full passive range of motion without pain. Neck supple.    Cardiovascular: Normal rate and regular rhythm.  Exam reveals no edema.     Pulmonary/Chest: Effort normal and breath sounds normal. She has no wheezes.        Abdominal: Soft. Normal appearance and bowel sounds are normal. She exhibits no distension. There is no tenderness. There is no rebound and no guarding.     Genitourinary: Vagina normal and uterus normal. Pelvic exam was performed with patient supine. There is no tenderness or lesion on the right labia. There is no tenderness or lesion on the left labia. Uterus is not deviated, not fixed and not hosting fibroids. Cervix is normal. Right adnexum displays no mass and no tenderness. Left adnexum displays no mass and no tenderness. No rectocele, cystocele or unspecified prolapse of vaginal walls in the vagina. No vaginal discharge found. Labial bartholins normal.Cervix exhibits no motion tenderness and no discharge.   Genitourinary Comments: Significant pelvic floor spasm bilaterally.  Difficult to even insert a speculum for examination.             Musculoskeletal: Normal range of motion and moves all extremeties.       Neurological: She is alert and oriented to person, place, and time. She has normal strength.    Skin: Skin is warm and dry.    Psychiatric: She has a normal mood and affect. Her speech is normal and behavior is normal. Thought content normal. Her mood appears not anxious. She does not exhibit a depressed mood. She expresses no suicidal plans and no homicidal plans.          Assessment:        1. Pelvic floor dysfunction                Plan:      Dominique was seen today for pelvic pain.    Diagnoses and all orders for this visit:    Pelvic floor dysfunction  -     OPW Baclofen 4% Diazepam 5mg Lidocaine 2% Vag Supp; Place one suppository vaginally 2 (two) times daily. (12 Suppositories) Refrigerate.  Medication expires in 180 days.  -     Ambulatory referral/consult to Physical Therapy; Future  -     Ambulatory referral/consult to Physical/Occupational Therapy; Future  Exam today more consistent with pelvic floor spasm (as opposed to endo).  Will benefit from re-assessment by pelvic floor PT.  Will also start vaginal valium to help immediately relax the pelvis.

## 2020-03-18 ENCOUNTER — DOCUMENTATION ONLY (OUTPATIENT)
Dept: REHABILITATION | Facility: OTHER | Age: 25
End: 2020-03-18

## 2020-03-18 NOTE — PROGRESS NOTES
Called pt to inform of suspension of pelvic health PT services at this time. .    Maricruz Laureano, SARAHT

## 2020-03-22 ENCOUNTER — HOSPITAL ENCOUNTER (EMERGENCY)
Facility: OTHER | Age: 25
Discharge: HOME | End: 2020-03-22
Attending: EMERGENCY MEDICINE
Payer: COMMERCIAL

## 2020-03-22 VITALS
HEART RATE: 65 BPM | TEMPERATURE: 98 F | BODY MASS INDEX: 23.92 KG/M2 | HEIGHT: 62 IN | RESPIRATION RATE: 18 BRPM | DIASTOLIC BLOOD PRESSURE: 88 MMHG | OXYGEN SATURATION: 98 % | WEIGHT: 130 LBS | SYSTOLIC BLOOD PRESSURE: 134 MMHG

## 2020-03-22 DIAGNOSIS — R07.89 CHEST TIGHTNESS: ICD-10-CM

## 2020-03-22 DIAGNOSIS — M94.0 COSTOCHONDRITIS, ACUTE: Primary | ICD-10-CM

## 2020-03-22 DIAGNOSIS — R07.89 CHEST WALL PAIN: ICD-10-CM

## 2020-03-22 LAB
B-HCG UR QL: NEGATIVE
CTP QC/QA: YES

## 2020-03-22 PROCEDURE — 96372 THER/PROPH/DIAG INJ SC/IM: CPT

## 2020-03-22 PROCEDURE — 63600175 PHARM REV CODE 636 W HCPCS: Performed by: NURSE PRACTITIONER

## 2020-03-22 PROCEDURE — 99284 EMERGENCY DEPT VISIT MOD MDM: CPT | Mod: 25

## 2020-03-22 PROCEDURE — 81025 URINE PREGNANCY TEST: CPT | Performed by: EMERGENCY MEDICINE

## 2020-03-22 PROCEDURE — 93005 ELECTROCARDIOGRAM TRACING: CPT

## 2020-03-22 RX ORDER — KETOROLAC TROMETHAMINE 30 MG/ML
30 INJECTION, SOLUTION INTRAMUSCULAR; INTRAVENOUS
Status: COMPLETED | OUTPATIENT
Start: 2020-03-22 | End: 2020-03-22

## 2020-03-22 RX ORDER — IBUPROFEN 600 MG/1
600 TABLET ORAL EVERY 6 HOURS PRN
Qty: 20 TABLET | Refills: 0 | Status: SHIPPED | OUTPATIENT
Start: 2020-03-22 | End: 2020-03-27

## 2020-03-22 RX ADMIN — KETOROLAC TROMETHAMINE 30 MG: 30 INJECTION, SOLUTION INTRAMUSCULAR; INTRAVENOUS at 01:03

## 2020-03-22 NOTE — DISCHARGE INSTRUCTIONS
Your EKG and chest x-ray are both negative.  We have prescribed you anti-inflammatories for pain.  You can use ice or heat for comfort.  Please follow-up with your PCP as needed.    Our goal in the emergency department is to always give you outstanding care and exceptional service. You may receive a survey by mail or e-mail in the next week regarding your experience in our ED. We would greatly appreciate your completing and returning the survey. Your feedback provides us with a way to recognize our staff who give very good care and it helps us learn how to improve when your experience was below our aspiration of excellence.

## 2020-03-22 NOTE — ED TRIAGE NOTES
"+midsternal chest pain since yesterday described as "pressure/sharp". Pt reports this has happened before in the past when she had a panic attack. Pt denies fever, chills,n/v/d, sob, abdominal pain   "

## 2020-03-22 NOTE — ED PROVIDER NOTES
Encounter Date: 3/22/2020       History     Chief Complaint   Patient presents with    Chest Pain     +Midsternal chest pressure x1 day with SOB on exertion. Currently on BC. Denies cardiac history.      Patient is a 24-year-old female with medical history of migraines and endometriosis presenting to the ED for midsternal chest pressure with associated shortness of breath for the past day.  Patient denies any cough or fevers.  Patient states pain is worse when she touches her chest.  Patient denies any falls or trauma    The history is provided by the patient.     Review of patient's allergies indicates:  No Known Allergies  Past Medical History:   Diagnosis Date    Endometriosis     Migraines      Past Surgical History:   Procedure Laterality Date    DIAGNOSTIC LAPAROSCOPY Bilateral 10/24/2019    Procedure: LAPAROSCOPY, DIAGNOSTIC;  Surgeon: Arnie Gonsalves MD;  Location: [facility] OR;  Service: OB/GYN;  Laterality: Bilateral;  [facility] confirmed 10/15/dme    PELVIC LAPAROSCOPY  2014    Endometriosis    SURGICAL REMOVAL OF ENDOMETRIOSIS N/A 10/24/2019    Procedure: DESTRUCTION, ENDOMETRIOSIS;  Surgeon: Arnie Gonsalves MD;  Location: [facility] OR;  Service: OB/GYN;  Laterality: N/A;     Family History   Problem Relation Age of Onset    Diabetes Maternal Aunt     Miscarriages / Stillbirths Mother     Diabetes Father     Breast cancer Neg Hx     Colon cancer Neg Hx     Ovarian cancer Neg Hx      Social History     Tobacco Use    Smoking status: Never Smoker    Smokeless tobacco: Never Used   Substance Use Topics    Alcohol use: Yes     Comment: Occasionally.    Drug use: No     Review of Systems   Constitutional: Negative for activity change, appetite change, chills, fatigue and fever.   HENT: Negative for congestion, sinus pressure, sinus pain and sore throat.    Respiratory: Positive for chest tightness. Negative for cough and shortness of breath.    Cardiovascular: Positive for chest pain ( Chest wall pain).    Gastrointestinal: Negative for abdominal pain, constipation, diarrhea, nausea and vomiting.   Genitourinary: Negative for dysuria.   Musculoskeletal: Negative for back pain and myalgias.   Skin: Negative for rash and wound.   Allergic/Immunologic: Negative for immunocompromised state.   Neurological: Negative for dizziness, syncope, weakness, numbness and headaches.   Hematological: Does not bruise/bleed easily.   All other systems reviewed and are negative.      Physical Exam     Initial Vitals [03/22/20 1332]   BP Pulse Resp Temp SpO2   134/88 77 18 99.1 °F (37.3 °C) 95 %      MAP       --         Physical Exam    Nursing note and vitals reviewed.  Constitutional: Vital signs are normal. She appears well-developed and well-nourished. She is cooperative. No distress.   HENT:   Head: Normocephalic and atraumatic.   Nose: Nose normal.   Mouth/Throat: Uvula is midline, oropharynx is clear and moist and mucous membranes are normal.   Eyes: Conjunctivae, EOM and lids are normal. Pupils are equal, round, and reactive to light.   Neck: Trachea normal, normal range of motion, full passive range of motion without pain and phonation normal. Neck supple. No spinous process tenderness and no muscular tenderness present. No JVD present.   Cardiovascular: Normal rate, regular rhythm and intact distal pulses.   Pulses:       Radial pulses are 2+ on the right side, and 2+ on the left side.        Dorsalis pedis pulses are 2+ on the right side, and 2+ on the left side.   Pulmonary/Chest: Effort normal and breath sounds normal. Chest wall is not dull to percussion. She exhibits tenderness. She exhibits no crepitus, no edema and no deformity.       Abdominal: Soft. Normal appearance and bowel sounds are normal. She exhibits no distension. There is no tenderness. There is no rigidity, no rebound and no guarding.   Neurological: She is alert and oriented to person, place, and time. She has normal strength. No sensory deficit. GCS eye  subscore is 4. GCS verbal subscore is 5. GCS motor subscore is 6.   Skin: Skin is warm, dry and intact. Capillary refill takes less than 2 seconds. No pallor.         ED Course   Procedures  Labs Reviewed   POCT URINE PREGNANCY     EKG Readings: (Independently Interpreted)   Initial Reading: No STEMI. Rhythm: Normal Sinus Rhythm. Heart Rate: 63. Ectopy: No Ectopy. Conduction: Normal.   My own interpretation       Imaging Results          X-Ray Chest PA And Lateral (Final result)  Result time 03/22/20 14:40:17    Final result by Endy Pino MD (03/22/20 14:40:17)                 Impression:      1. No acute cardiopulmonary process.      Electronically signed by: Endy Pino MD  Date:    03/22/2020  Time:    14:40             Narrative:    EXAMINATION:  XR CHEST PA AND LATERAL    CLINICAL HISTORY:  Other chest pain    TECHNIQUE:  PA and lateral views of the chest were performed.    COMPARISON:  10/13/2013    FINDINGS:  The cardiomediastinal silhouette is not enlarged.  There is no pleural effusion.  The trachea is midline.  The lungs are symmetrically expanded bilaterally without evidence of acute parenchymal process. No large focal consolidation seen.  There is no pneumothorax.  The osseous structures are unremarkable.                              X-Rays:   Independently Interpreted Readings:   Other Readings:  Reviewed by me, read by Radiology    Medical Decision Making:   Initial Assessment:   Emergent evaluation of a 25 yo female patient presenting to the ER with chief complaint of mid substernal chest pain for the past day.  Patient states pain is worse with palpation.  Patient denies trying anything for her symptoms.  On exam patient is A&O x3.  Vital signs stable.  Not febrile and nontoxic appearing.  Breath sounds clear bilaterally.  No pleuritic chest pain appreciated.  Midsternal chest pain to palpation noted.  Abdomen soft and nontender.  Bowel sounds within normal limits.  Cap refill less than 3  sec.  Differential Diagnosis:   Differential diagnoses include but are not limited to pneumothorax, chest wall pain, pleurisy, pericarditis, myocarditis, asthma exacerbation, pneumonia, costochondritis.  I considered but do not suspect pulmonary embolus.      Independently Interpreted Test(s):   I have ordered and independently interpreted X-rays - see prior notes.  I have ordered and independently interpreted EKG Reading(s) - see prior notes  Clinical Tests:   Lab Tests: Ordered and Reviewed  The following lab test(s) were unremarkable: UPT       <> Summary of Lab: Urine preg negative  Radiological Study: Ordered and Reviewed  Medical Tests: Ordered and Reviewed  ED Management:  I will get EKG, chest x-ray, medicate and reassess.  EKG shows no acute abnormalities.  Chest x-ray negative for any acute abnormalities.    Patient reassessed after pain medication.  Patient states feeling slightly better.  Advised we will send home with anti-inflammatories.  Ice or heat for comfort.  Follow-up with PCP as needed.  Patient verbalized understanding of this plan of care.  All questions and concerns addressed.    Patient is hemodynamically stable, vital signs are normal. Discharge instructions given. Return to ED precautions discussed. Follow up as directed. Pt verbalized understanding of this plan.  Pt is stable for discharge.                                          Clinical Impression:       ICD-10-CM ICD-9-CM   1. Costochondritis, acute M94.0 733.6   2. Chest wall pain R07.89 786.52   3. Chest tightness R07.89 786.59         Disposition:   Disposition: Discharged  Condition: Stable     ED Disposition Condition    Discharge Stable        ED Prescriptions     Medication Sig Dispense Start Date End Date Auth. Provider    ibuprofen (ADVIL,MOTRIN) 600 MG tablet Take 1 tablet (600 mg total) by mouth every 6 (six) hours as needed for Pain. 20 tablet 3/22/2020 3/27/2020 Magali Mandujano NP        Follow-up Information     Follow  up With Specialties Details Why Contact Info    [facility], Family Medicine Schedule an appointment as soon as possible for a visit  As needed [address] [facility] [address]  [phone]                                       Magali Mandujano NP  03/22/20 1508

## 2021-01-15 RX ORDER — NORETHINDRONE ACETATE AND ETHINYL ESTRADIOL AND FERROUS FUMARATE 1MG-20(24)
1 KIT ORAL DAILY
Qty: 84 TABLET | Refills: 4 | Status: SHIPPED | OUTPATIENT
Start: 2021-01-15 | End: 2021-03-08

## 2021-03-08 ENCOUNTER — OFFICE VISIT (OUTPATIENT)
Dept: OBSTETRICS AND GYNECOLOGY | Facility: CLINIC | Age: 26
End: 2021-03-08
Payer: COMMERCIAL

## 2021-03-08 VITALS
HEIGHT: 62 IN | SYSTOLIC BLOOD PRESSURE: 112 MMHG | WEIGHT: 140.63 LBS | DIASTOLIC BLOOD PRESSURE: 64 MMHG | BODY MASS INDEX: 25.88 KG/M2

## 2021-03-08 PROCEDURE — 99214 PR OFFICE/OUTPT VISIT, EST, LEVL IV, 30-39 MIN: ICD-10-PCS | Mod: S$GLB,,, | Performed by: OBSTETRICS & GYNECOLOGY

## 2021-03-08 PROCEDURE — 99999 PR PBB SHADOW E&M-EST. PATIENT-LVL III: CPT | Mod: PBBFAC,,, | Performed by: OBSTETRICS & GYNECOLOGY

## 2021-03-08 PROCEDURE — 99214 OFFICE O/P EST MOD 30 MIN: CPT | Mod: S$GLB,,, | Performed by: OBSTETRICS & GYNECOLOGY

## 2021-03-08 PROCEDURE — 99999 PR PBB SHADOW E&M-EST. PATIENT-LVL III: ICD-10-PCS | Mod: PBBFAC,,, | Performed by: OBSTETRICS & GYNECOLOGY

## 2021-03-08 RX ORDER — NORETHINDRONE ACETATE AND ETHINYL ESTRADIOL 1.5-30(21)
1 KIT ORAL DAILY
Qty: 84 TABLET | Refills: 4 | Status: SHIPPED | OUTPATIENT
Start: 2021-03-08 | End: 2021-06-02

## 2021-04-26 ENCOUNTER — PATIENT MESSAGE (OUTPATIENT)
Dept: RESEARCH | Facility: HOSPITAL | Age: 26
End: 2021-04-26

## 2021-05-03 ENCOUNTER — PATIENT MESSAGE (OUTPATIENT)
Dept: OBSTETRICS AND GYNECOLOGY | Facility: CLINIC | Age: 26
End: 2021-05-03

## 2021-05-03 DIAGNOSIS — N94.6 DYSMENORRHEA: Primary | ICD-10-CM

## 2021-05-03 RX ORDER — KETOROLAC TROMETHAMINE 10 MG/1
10 TABLET, FILM COATED ORAL EVERY 6 HOURS
Qty: 20 TABLET | Refills: 0 | Status: SHIPPED | OUTPATIENT
Start: 2021-05-03 | End: 2021-05-08

## 2021-06-01 ENCOUNTER — PATIENT MESSAGE (OUTPATIENT)
Dept: OBSTETRICS AND GYNECOLOGY | Facility: CLINIC | Age: 26
End: 2021-06-01

## 2021-06-02 ENCOUNTER — PATIENT MESSAGE (OUTPATIENT)
Dept: OBSTETRICS AND GYNECOLOGY | Facility: CLINIC | Age: 26
End: 2021-06-02

## 2021-06-02 RX ORDER — NORETHINDRONE ACETATE AND ETHINYL ESTRADIOL AND FERROUS FUMARATE 1MG-20(24)
1 KIT ORAL DAILY
Qty: 84 TABLET | Refills: 4 | Status: SHIPPED | OUTPATIENT
Start: 2021-06-02 | End: 2021-09-23

## 2021-09-23 ENCOUNTER — OFFICE VISIT (OUTPATIENT)
Dept: OBSTETRICS AND GYNECOLOGY | Facility: CLINIC | Age: 26
End: 2021-09-23

## 2021-09-23 VITALS
WEIGHT: 133.19 LBS | BODY MASS INDEX: 24.51 KG/M2 | SYSTOLIC BLOOD PRESSURE: 98 MMHG | DIASTOLIC BLOOD PRESSURE: 72 MMHG | HEIGHT: 62 IN

## 2021-09-23 DIAGNOSIS — N93.8 DUB (DYSFUNCTIONAL UTERINE BLEEDING): Primary | ICD-10-CM

## 2021-09-23 DIAGNOSIS — Z30.013 ENCOUNTER FOR INITIAL PRESCRIPTION OF INJECTABLE CONTRACEPTIVE: ICD-10-CM

## 2021-09-23 DIAGNOSIS — R10.2 PELVIC PAIN: ICD-10-CM

## 2021-09-23 PROCEDURE — 99212 OFFICE O/P EST SF 10 MIN: CPT | Mod: PBBFAC | Performed by: OBSTETRICS & GYNECOLOGY

## 2021-09-23 PROCEDURE — 99999 PR PBB SHADOW E&M-EST. PATIENT-LVL II: ICD-10-PCS | Mod: PBBFAC,,, | Performed by: OBSTETRICS & GYNECOLOGY

## 2021-09-23 PROCEDURE — 99213 OFFICE O/P EST LOW 20 MIN: CPT | Mod: S$PBB,,, | Performed by: OBSTETRICS & GYNECOLOGY

## 2021-09-23 PROCEDURE — 99213 PR OFFICE/OUTPT VISIT, EST, LEVL III, 20-29 MIN: ICD-10-PCS | Mod: S$PBB,,, | Performed by: OBSTETRICS & GYNECOLOGY

## 2021-09-23 PROCEDURE — 99999 PR PBB SHADOW E&M-EST. PATIENT-LVL II: CPT | Mod: PBBFAC,,, | Performed by: OBSTETRICS & GYNECOLOGY

## 2021-09-23 RX ORDER — NORETHINDRONE ACETATE AND ETHINYL ESTRADIOL 1.5-30(21)
1 KIT ORAL DAILY
Qty: 84 TABLET | Refills: 4 | Status: SHIPPED | OUTPATIENT
Start: 2021-09-23 | End: 2022-09-26

## 2021-09-23 RX ORDER — GABAPENTIN 100 MG/1
100 CAPSULE ORAL DAILY
Qty: 30 CAPSULE | Refills: 4 | Status: SHIPPED | OUTPATIENT
Start: 2021-09-23 | End: 2021-11-03

## 2021-11-03 ENCOUNTER — OFFICE VISIT (OUTPATIENT)
Dept: URGENT CARE | Facility: CLINIC | Age: 26
End: 2021-11-03
Payer: COMMERCIAL

## 2021-11-03 VITALS
DIASTOLIC BLOOD PRESSURE: 56 MMHG | HEART RATE: 84 BPM | HEIGHT: 62 IN | WEIGHT: 133 LBS | TEMPERATURE: 99 F | BODY MASS INDEX: 24.48 KG/M2 | SYSTOLIC BLOOD PRESSURE: 99 MMHG | OXYGEN SATURATION: 98 %

## 2021-11-03 DIAGNOSIS — R30.0 DYSURIA: ICD-10-CM

## 2021-11-03 DIAGNOSIS — N30.01 ACUTE CYSTITIS WITH HEMATURIA: Primary | ICD-10-CM

## 2021-11-03 LAB
B-HCG UR QL: NEGATIVE
BILIRUB UR QL STRIP: NEGATIVE
CTP QC/QA: YES
GLUCOSE UR QL STRIP: NEGATIVE
KETONES UR QL STRIP: NEGATIVE
LEUKOCYTE ESTERASE UR QL STRIP: NEGATIVE
PH, POC UA: 6.5 (ref 5–8)
POC BLOOD, URINE: POSITIVE
POC NITRATES, URINE: NEGATIVE
PROT UR QL STRIP: POSITIVE
SP GR UR STRIP: 1.02 (ref 1–1.03)
UROBILINOGEN UR STRIP-ACNC: NORMAL (ref 0.1–1.1)

## 2021-11-03 PROCEDURE — 1159F PR MEDICATION LIST DOCUMENTED IN MEDICAL RECORD: ICD-10-PCS | Mod: CPTII,S$GLB,, | Performed by: NURSE PRACTITIONER

## 2021-11-03 PROCEDURE — 81025 URINE PREGNANCY TEST: CPT | Mod: S$GLB,,, | Performed by: NURSE PRACTITIONER

## 2021-11-03 PROCEDURE — 1160F RVW MEDS BY RX/DR IN RCRD: CPT | Mod: CPTII,S$GLB,, | Performed by: NURSE PRACTITIONER

## 2021-11-03 PROCEDURE — 81003 URINALYSIS AUTO W/O SCOPE: CPT | Mod: QW,S$GLB,, | Performed by: NURSE PRACTITIONER

## 2021-11-03 PROCEDURE — 99203 OFFICE O/P NEW LOW 30 MIN: CPT | Mod: 25,S$GLB,, | Performed by: NURSE PRACTITIONER

## 2021-11-03 PROCEDURE — 3078F PR MOST RECENT DIASTOLIC BLOOD PRESSURE < 80 MM HG: ICD-10-PCS | Mod: CPTII,S$GLB,, | Performed by: NURSE PRACTITIONER

## 2021-11-03 PROCEDURE — 3074F PR MOST RECENT SYSTOLIC BLOOD PRESSURE < 130 MM HG: ICD-10-PCS | Mod: CPTII,S$GLB,, | Performed by: NURSE PRACTITIONER

## 2021-11-03 PROCEDURE — 99203 PR OFFICE/OUTPT VISIT, NEW, LEVL III, 30-44 MIN: ICD-10-PCS | Mod: 25,S$GLB,, | Performed by: NURSE PRACTITIONER

## 2021-11-03 PROCEDURE — 81003 POCT URINALYSIS, DIPSTICK, AUTOMATED, W/O SCOPE: ICD-10-PCS | Mod: QW,S$GLB,, | Performed by: NURSE PRACTITIONER

## 2021-11-03 PROCEDURE — 1159F MED LIST DOCD IN RCRD: CPT | Mod: CPTII,S$GLB,, | Performed by: NURSE PRACTITIONER

## 2021-11-03 PROCEDURE — 3008F PR BODY MASS INDEX (BMI) DOCUMENTED: ICD-10-PCS | Mod: CPTII,S$GLB,, | Performed by: NURSE PRACTITIONER

## 2021-11-03 PROCEDURE — 3008F BODY MASS INDEX DOCD: CPT | Mod: CPTII,S$GLB,, | Performed by: NURSE PRACTITIONER

## 2021-11-03 PROCEDURE — 1160F PR REVIEW ALL MEDS BY PRESCRIBER/CLIN PHARMACIST DOCUMENTED: ICD-10-PCS | Mod: CPTII,S$GLB,, | Performed by: NURSE PRACTITIONER

## 2021-11-03 PROCEDURE — 3074F SYST BP LT 130 MM HG: CPT | Mod: CPTII,S$GLB,, | Performed by: NURSE PRACTITIONER

## 2021-11-03 PROCEDURE — 3078F DIAST BP <80 MM HG: CPT | Mod: CPTII,S$GLB,, | Performed by: NURSE PRACTITIONER

## 2021-11-03 PROCEDURE — 81025 POCT URINE PREGNANCY: ICD-10-PCS | Mod: S$GLB,,, | Performed by: NURSE PRACTITIONER

## 2021-11-03 RX ORDER — NITROFURANTOIN 25; 75 MG/1; MG/1
100 CAPSULE ORAL 2 TIMES DAILY
Qty: 10 CAPSULE | Refills: 0 | Status: SHIPPED | OUTPATIENT
Start: 2021-11-03 | End: 2021-11-08

## 2021-11-03 RX ORDER — PHENAZOPYRIDINE HYDROCHLORIDE 200 MG/1
200 TABLET, FILM COATED ORAL 3 TIMES DAILY PRN
Qty: 6 TABLET | Refills: 0 | Status: SHIPPED | OUTPATIENT
Start: 2021-11-03 | End: 2021-11-05

## 2022-11-16 ENCOUNTER — OFFICE VISIT (OUTPATIENT)
Dept: URGENT CARE | Facility: CLINIC | Age: 27
End: 2022-11-16
Payer: COMMERCIAL

## 2022-11-16 VITALS
SYSTOLIC BLOOD PRESSURE: 107 MMHG | TEMPERATURE: 99 F | WEIGHT: 130 LBS | BODY MASS INDEX: 23.92 KG/M2 | HEART RATE: 98 BPM | OXYGEN SATURATION: 99 % | RESPIRATION RATE: 18 BRPM | DIASTOLIC BLOOD PRESSURE: 74 MMHG | HEIGHT: 62 IN

## 2022-11-16 DIAGNOSIS — R52 BODY ACHES: ICD-10-CM

## 2022-11-16 DIAGNOSIS — B34.9 ACUTE VIRAL SYNDROME: Primary | ICD-10-CM

## 2022-11-16 DIAGNOSIS — R05.8 COUGH WITH CONGESTION OF PARANASAL SINUS: ICD-10-CM

## 2022-11-16 DIAGNOSIS — Z11.59 ENCOUNTER FOR SCREENING FOR VIRAL DISEASE: ICD-10-CM

## 2022-11-16 DIAGNOSIS — J02.9 SORE THROAT: ICD-10-CM

## 2022-11-16 DIAGNOSIS — R09.81 COUGH WITH CONGESTION OF PARANASAL SINUS: ICD-10-CM

## 2022-11-16 DIAGNOSIS — Z11.52 ENCOUNTER FOR SCREENING FOR COVID-19: ICD-10-CM

## 2022-11-16 DIAGNOSIS — R50.9 LOW GRADE FEVER: ICD-10-CM

## 2022-11-16 LAB
CTP QC/QA: YES
CTP QC/QA: YES
POC MOLECULAR INFLUENZA A AGN: NEGATIVE
POC MOLECULAR INFLUENZA B AGN: NEGATIVE
SARS-COV-2 RDRP RESP QL NAA+PROBE: NEGATIVE

## 2022-11-16 PROCEDURE — 3078F DIAST BP <80 MM HG: CPT | Mod: CPTII,S$GLB,, | Performed by: PHYSICIAN ASSISTANT

## 2022-11-16 PROCEDURE — 87635: ICD-10-PCS | Mod: QW,S$GLB,, | Performed by: PHYSICIAN ASSISTANT

## 2022-11-16 PROCEDURE — 87502 POCT INFLUENZA A/B MOLECULAR: ICD-10-PCS | Mod: QW,S$GLB,, | Performed by: PHYSICIAN ASSISTANT

## 2022-11-16 PROCEDURE — 99214 OFFICE O/P EST MOD 30 MIN: CPT | Mod: S$GLB,,, | Performed by: PHYSICIAN ASSISTANT

## 2022-11-16 PROCEDURE — 3074F SYST BP LT 130 MM HG: CPT | Mod: CPTII,S$GLB,, | Performed by: PHYSICIAN ASSISTANT

## 2022-11-16 PROCEDURE — 1159F PR MEDICATION LIST DOCUMENTED IN MEDICAL RECORD: ICD-10-PCS | Mod: CPTII,S$GLB,, | Performed by: PHYSICIAN ASSISTANT

## 2022-11-16 PROCEDURE — 1159F MED LIST DOCD IN RCRD: CPT | Mod: CPTII,S$GLB,, | Performed by: PHYSICIAN ASSISTANT

## 2022-11-16 PROCEDURE — 3008F PR BODY MASS INDEX (BMI) DOCUMENTED: ICD-10-PCS | Mod: CPTII,S$GLB,, | Performed by: PHYSICIAN ASSISTANT

## 2022-11-16 PROCEDURE — 87635 SARS-COV-2 COVID-19 AMP PRB: CPT | Mod: QW,S$GLB,, | Performed by: PHYSICIAN ASSISTANT

## 2022-11-16 PROCEDURE — 3078F PR MOST RECENT DIASTOLIC BLOOD PRESSURE < 80 MM HG: ICD-10-PCS | Mod: CPTII,S$GLB,, | Performed by: PHYSICIAN ASSISTANT

## 2022-11-16 PROCEDURE — 3008F BODY MASS INDEX DOCD: CPT | Mod: CPTII,S$GLB,, | Performed by: PHYSICIAN ASSISTANT

## 2022-11-16 PROCEDURE — 3074F PR MOST RECENT SYSTOLIC BLOOD PRESSURE < 130 MM HG: ICD-10-PCS | Mod: CPTII,S$GLB,, | Performed by: PHYSICIAN ASSISTANT

## 2022-11-16 PROCEDURE — 99214 PR OFFICE/OUTPT VISIT, EST, LEVL IV, 30-39 MIN: ICD-10-PCS | Mod: S$GLB,,, | Performed by: PHYSICIAN ASSISTANT

## 2022-11-16 PROCEDURE — 87502 INFLUENZA DNA AMP PROBE: CPT | Mod: QW,S$GLB,, | Performed by: PHYSICIAN ASSISTANT

## 2022-11-16 NOTE — LETTER
[address] ? [place], [address] ? Phone [phone] ? Fax [phone]           Return to Work/School    Patient: Dominique Huynh  YOB: 1995   Date: 11/16/2022      To Whom It May Concern:     Dominique Huynh was in contact with/seen in my office on 11/16/2022. COVID-19 is present in our communities across the [facility]. Not all patients are eligible or appropriate to be tested. In this situation, your employee meets the following criteria:     Dominique Huynh has met the criteria for COVID-19 testing and has a NEGATIVE result. The employee can return to work once they are asymptomatic for 24 hours without the use of fever reducing medications (Tylenol, Motrin, etc).  Okay to return 11/18/22 or 11/19/22 pending above.     If you have any questions or concerns, or if I can be of further assistance, please do not hesitate to contact me.     Sincerely,    Braydon Candelario PA-C

## 2022-11-16 NOTE — PATIENT INSTRUCTIONS
PLEASE READ YOUR DISCHARGE INSTRUCTIONS ENTIRELY AS IT CONTAINS IMPORTANT INFORMATION.    Patient had covid testing done today.      If Negative and no direct exposure: symptom free without fever reducing meds in 24 hours - can go back to work in 24 hours with surgical mask for 14 days.  If you are directly exposed to someone who tested positive, you need to return for repeat testing in the next 5-7 days.  You may return sooner if you have any new worsening symptoms.    Discussed corona virus precautions and reviewed CDC FAC; printed a copy for patient.  I discussed to continue to monitor their symptoms. Discussed that if their symptoms persist or worsen to seek re-evaluation. Clinic vs. ER precautions were given.  Patient verbalized understanding and agreed with the above plan of care.    - Rest.  Drink plenty of fluids.    - Tylenol/anti-inflammatory(ibuprofen/motrin/aleve) as directed as needed for fever/pain.  For Tylenol, do not exceed 3000 mg/ day. If no contraindication.  -OK to supplement with OTC DayQuil, NyQuil or TheraFlu every 6 hours as needed for cough and congestion.  Use caution of total amount of Tylenol/acetaminophen per day.  - Reviewed radiographs and all diagnostic testing with patient/family.      -Below are suggestions for symptomatic relief:              -Salt water gargles to soothe throat pain.              -Chloroseptic spray also helps to numb throat pain.              -Nasal saline spray reduces inflammation and dryness.              -Warm face compresses to help with facial sinus pain/pressure.              -Vicks vapor rub at night.              -Flonase OTC or Nasacort OTC  once a day for nasal/sinus congestion. DON'T USE IF YOU HAVE GLAUCOMA. CHECK WITH YOUR PHARMACIST/PHYSICIAN.              -Simple foods like chicken noodle soup.              -Mucinex DM (ANY COUGH EXPECTORANT--guaifenesin) for cough or chest congestion with mucus during the day time. Delsym or robitussin (ANY  COUGH SUPPRESSANT--dextromethorphan) helps with coughing at night. Mucinex-DM if you have chest congestion or sputum (caution if history of high blood pressure or palpitations).              -Zyrtec/Claritin/xyzal during the day time  & Benadryl at night (only if severe runny nose) may help with allergies and runny nose. Add decongestant if you have nasal/sinus congestion/sinus pressure/ear fullness sensation. (see below)    Caution with use of Decongestant meds:  -If you DO NOT have Hypertension or any history of palpitations, it is ok to take over the counter Sudafed or Mucinex D or Allegra-D or Claritin-D or Zyrtec-D.  -If you do take one of the above, it is ok to combine that with plain over the counter Mucinex or Allegra or Claritin or Zyrtec. If, for example, you are taking Zyrtec -D, you can combine that with Mucinex, but not Mucinex-D.  If you are taking Mucinex-D, you can combine that with plain Allegra or Claritin or Zyrtec.     -Do not combine pseudophed or phenylephrine with any other brand allergy-D for DECONGESTANT.   -Or vice versa, you can you take plain allergy medications (allegra/claritin/zyrtec with NO Decongestant) and ADD OTC pseudophed or phenelyphrine 2-3 times a day. Avoid taking decongestant late at night or with caffeine as it can keep you up or cause jittery feeling.    -If you DO have Hypertension , anxiety, or palpitations, it is safe to take Coricidin HBP for relief of cough, congestion, or sinus symptoms.      For your GI symptoms:  -Use gatorade/pedialyte or rehydration packets to help stay hydrated. Vitamin water and plain water do not contain rehydrating electrolytes.  -Increase clear liquids (water, gatorade, pedialyte, broths, jello, etc) Hold off on solids for 12-18 hours. Then advance to BRAT diet (banana, rice, applesauce, tea, toast/crackers), then advance further as tolerated. Avoid spicy or fatty foods.   -May take Imitrol OTC as needed for nausea.   -Use Peptobismol or  Immodium to help alleviate your diarrhea symptoms.   -Take mylanta or simethicone for bloating or gas pain.   -Take pepcid or omeprazole if you have heartburn or reflux sensation.  -Avoid imodium unless you have more than 6 loose stools in 24 hours. Take 1 dose and monitor to see if you can repeat AS IT WILL CAUSE CONSTIPATION.  -Wash hands frequently while sick. Avoid ibuprofen or other NSAIDS until you are well.   -Please go to the ER if you experience worsening abdominal pain, blood in your vomit or stool, high fever, dizziness, fainting, swelling of your abdomen, inability to pass gas or stool, or inability to urinate.         -You must understand that you've received an Urgent Care treatment only and that you may be released before all your medical problems are known or treated. You, the patient, will arrange for follow up care as instructed. Please arrange follow up with your primary medical clinic within 2-5 days if your signs and symptoms have not resolved or worsen.     - Follow up with your PCP or specialty clinic as directed.  You can call [phone] or [phone] to schedule an appointment with the appropriate provider.    - If your condition worsens or fails to improve we recommend that you receive another evaluation at the emergency room immediately or contact your primary medical clinic to discuss your concerns.              Prevention steps for patients with confirmed or suspected COVID-19  Stay home and stay away from family members and friends. The CDC says, you can leave home after these three things have happened: 1) You have had no fever for at least 24 hours (that is one full day of no fever without the use of medicine that reduces fevers) 2) AND other symptoms have improved (for example, when your cough or shortness of breath have improved) 3) AND at least 10 days have passed since your symptoms first appeared OR after 10 days passed from first positive test.  Separate yourself from  other people and animals in your home.  Call ahead before visiting your doctor.  Wear a facemask.  Cover your coughs and sneezes.  Wash your hands often with soap and water; hand  can be used, too.  Avoid sharing personal household items.  Wipe down surfaces used daily.  Monitor your symptoms. Seek prompt medical attention if your illness is worsening (e.g., difficulty breathing).   Before seeking care, call your healthcare provider.  If you have a medical emergency and need to call 911, notify the dispatch personnel that you have, or are being evaluated for COVID-19. If possible, put on a facemask before emergency medical services arrive.        Recommended precautions for household members, intimate partners, and caregivers in a home setting of a patient with symptomatic laboratory-confirmed COVID-19 or a patient under investigation.  Household members, intimate partners, and caregivers in the home setting awaiting tests results have close contact with a person with symptomatic, laboratory-confirmed COVID-19 or a person under investigation. Close contacts should monitor their health; they should call their provider right away if they develop symptoms suggestive of COVID-19 (e.g., fever, cough, shortness of breath).    Close contacts should also follow these recommendations:  Make sure that you understand and can help the patient follow their provider's instructions for medication(s) and care. You should help the patient with basic needs in the home and provide support for getting groceries, prescriptions, and other personal needs.  Monitor the patient's symptoms. If the patient is getting sicker, call his or her healthcare provider and tell them that the patient has laboratory-confirmed COVID-19. If the patient has a medical emergency and you need to call 911, notify the dispatch personnel that the patient has, or is being evaluated for COVID-19.  Household members should stay in another room or be   from the patient. Household members should use a separate bedroom and bathroom, if available.  Prohibit visitors.  Household members should care for any pets in the home.  Make sure that shared spaces in the home have good air flow, such as by an air conditioner or an opened window, weather permitting.  Perform hand hygiene frequently. Wash your hands often with soap and water for at least 20 seconds or use an alcohol-based hand  (that contains > 60% alcohol) covering all surfaces of your hands and rubbing them together until they feel dry. Soap and water should be used preferentially.  Avoid touching your eyes, nose, and mouth.  The patient should wear a facemask. If the patient is not able to wear a facemask (for example, because it causes trouble breathing), caregivers should wear a mask when they are in the same room as the patient.  Wear a disposable facemask and gloves when you touch or have contact with the patient's blood, stool, or body fluids, such as saliva, sputum, nasal mucus, vomit, urine.  Throw out disposable facemasks and gloves after using them. Do not reuse.  When removing personal protective equipment, first remove and dispose of gloves. Then, immediately clean your hands with soap and water or alcohol-based hand . Next, remove and dispose of facemask, and immediately clean your hands again with soap and water or alcohol-based hand .  You should not share dishes, drinking glasses, cups, eating utensils, towels, bedding, or other items with the patient. After the patient uses these items, you should wash them thoroughly (see below Wash laundry thoroughly).  Clean all high-touch surfaces, such as counters, tabletops, doorknobs, bathroom fixtures, toilets, phones, keyboards, tablets, and bedside tables, every day. Also, clean any surfaces that may have blood, stool, or body fluids on them.  Use a household cleaning spray or wipe, according to the label  instructions. Labels contain instructions for safe and effective use of the cleaning product including precautions you should take when applying the product, such as wearing gloves and making sure you have good ventilation during use of the product.  Wash laundry thoroughly.  Immediately remove and wash clothes or bedding that have blood, stool, or body fluids on them.  Wear disposable gloves while handling soiled items and keep soiled items away from your body. Clean your hands (with soap and water or an alcohol-based hand ) immediately after removing your gloves.  Read and follow directions on labels of laundry or clothing items and detergent. In general, using a normal laundry detergent according to washing machine instructions and dry thoroughly using the warmest temperatures recommended on the clothing label.  Place all used disposable gloves, facemasks, and other contaminated items in a lined container before disposing of them with other household waste. Clean your hands (with soap and water or an alcohol-based hand ) immediately after handling these items. Soap and water should be used preferentially if hands are visibly dirty.  Discuss any additional questions with your state or local health department or healthcare provider. Check available hours when contacting your local health department.    For more information see CDC link below.      https://www.cdc.gov/coronavirus/2019-ncov/hcp/guidance-prevent-spread.html#precautions        Sources:  [facility], [facility]          Instructions for Home Care of Patients and Caretakers with Coronavirus Disease 2019  Limit visitors to the home.  Older persons and those that have chronic medical conditions such as diabetes, lung and heart disease are at increased risk for illness.   If possible, patients should use a separate bedroom while recovering. Caregivers and household members should avoid prolonged contact with the  patient which means to stay 6 feet away and avoid contact with cough droplets.  When close contact is necessary, wash your hands before and immediately after contact.   Perform hand hygiene frequently. Wash your hands often with soap and water for at least 20 seconds or use an alcohol-based hand , covering all surfaces of your hands and rubbing them together until they feel dry.   Avoid touching your eyes, nose, and mouth with unwashed hands.  Avoid sharing household items with the patient. You should not share dishes, drinking glasses, cups, eating utensils, towels, bedding, or other items. After the patient uses these items, you should wash them thoroughly.  Wash laundry thoroughly.   Immediately remove and wash clothes or bedding that have blood, stool, or body fluids on them.  Clean all high-touch surfaces, such as counters, tabletops, doorknobs, bathroom fixtures, toilets, phones, keyboards, tablets, and bedside tables, every day.   Use a household cleaning spray or wipe, according to the label instructions. Labels contain instructions for safe and effective use of the cleaning product including precautions you should take when applying the product, such as wearing gloves and making sure you have good ventilation during use of the product.    For more information see CDC link below.      https://www.cdc.gov/coronavirus/2019-ncov/hcp/guidance-prevent-spread.html#precautions               If your symptoms worsen or if you have any other concerns, please contact [facility] at [phone].

## 2023-06-13 NOTE — TELEPHONE ENCOUNTER
Spoke with pt  Pt has been R/S due to Dr Gonsalves out of office.    Pt verbalized understanding.   Stable no

## 2023-12-07 NOTE — H&P
[facility] - Surgery ([place])  History & Physical    Subjective:      Pain weakness left shoulder persistent no improvement with multiple modalities MRI consistent with a partial rotator cuff tear with impingement admitted for arthroscopy repair    Patient Active Problem List    Diagnosis Date Noted    Endometriosis of pelvis 10/24/2019    Pelvic pain 02/26/2019    Pelvic floor dysfunction 02/26/2019     Past Medical History:   Diagnosis Date    Endometriosis     Migraines      Past Surgical History:   Procedure Laterality Date    DIAGNOSTIC LAPAROSCOPY Bilateral 10/24/2019    Procedure: LAPAROSCOPY, DIAGNOSTIC;  Surgeon: Arnie Gonsalves MD;  Location: [facility] OR;  Service: OB/GYN;  Laterality: Bilateral;  RODRIGUE[facility] confirmed 10/15/dme    PELVIC LAPAROSCOPY  2014    Endometriosis    SURGICAL REMOVAL OF ENDOMETRIOSIS N/A 10/24/2019    Procedure: DESTRUCTION, ENDOMETRIOSIS;  Surgeon: Arnie Gonsalves MD;  Location: [facility] OR;  Service: OB/GYN;  Laterality: N/A;     No medications prior to admission.     Review of patient's allergies indicates:  No Known Allergies  Social History     Tobacco Use    Smoking status: Never    Smokeless tobacco: Never   Substance Use Topics    Alcohol use: Not Currently     Comment: Occasionally.     Family History   Problem Relation Age of Onset    Diabetes Maternal Aunt     Miscarriages / Stillbirths Mother     Diabetes Father     Breast cancer Neg Hx     Colon cancer Neg Hx     Ovarian cancer Neg Hx      Social History     Tobacco Use    Smoking status: Never    Smokeless tobacco: Never   Substance Use Topics    Alcohol use: Not Currently     Comment: Occasionally.    Drug use: No       No medications prior to admission.     Review of patient's allergies indicates:  No Known Allergies     Review of Systems:  All other systems reviewed and are negative.  .    No current facility-administered medications for this encounter.     Current Outpatient Medications   Medication Sig    JUNEL FE 1.5/30,  28, 1.5 mg-30 mcg (21)/75 mg (7) tablet TAKE 1 TABLET BY MOUTH EVERY DAY    norethindrone-e.estradioL-iron (MINASTRIN 24 FE) 1 mg-20 mcg(24) /75 mg (4) Chew CHEW AND SWALLOW 1 TABLET BY MOUTH EVERY DAY. START NEW PACK ON DAY 25. SKIP PLACEBO       Objective:      Vital Signs (Most Recent)       Vital Signs Range (Last 24H):  BP: ()/()   Arterial Line BP: ()/()     Physical Exam weakness tenderness supraspinatus positive impingement    Imaging Review:   Partial rotator cuff tear with impingement      Assessment:      There are no hospital problems to display for this patient.      Plan:    The various methods of treatment have been discussed with the patient and family.   After consideration of risks, benefits and other options for treatment, the patient has consented to surgical interventions (significant risk discussed).  Questions were answered and Pre-op teaching was done by me.

## 2023-12-25 NOTE — TELEPHONE ENCOUNTER
----- Message from Luci Parra sent at 11/7/2018 10:56 AM CST -----  Contact: pt   Name of Who is Calling: UMU LE [[id number]]       What is the request in detail: patient is requesting a call in regards to school authorization release.....Please contact to further discuss and advise.       Can the clinic reply by [facility]: yes       What Number [facility] [phone]    
Spoke with pt    Informed pt that letter was emailed to the email address she provided.    Pt verbalized understanding.  
no concerns

## 2023-12-28 ENCOUNTER — ANESTHESIA EVENT (OUTPATIENT)
Dept: SURGERY | Facility: OTHER | Age: 28
End: 2023-12-28
Payer: COMMERCIAL

## 2023-12-28 ENCOUNTER — HOSPITAL ENCOUNTER (OUTPATIENT)
Dept: PREADMISSION TESTING | Facility: OTHER | Age: 28
Discharge: HOME | End: 2023-12-28
Attending: ORTHOPAEDIC SURGERY
Payer: COMMERCIAL

## 2023-12-28 VITALS
BODY MASS INDEX: 23.92 KG/M2 | WEIGHT: 130 LBS | HEART RATE: 63 BPM | HEIGHT: 62 IN | RESPIRATION RATE: 19 BRPM | DIASTOLIC BLOOD PRESSURE: 51 MMHG | SYSTOLIC BLOOD PRESSURE: 100 MMHG | TEMPERATURE: 98 F | OXYGEN SATURATION: 100 %

## 2023-12-28 RX ORDER — SODIUM CHLORIDE, SODIUM LACTATE, POTASSIUM CHLORIDE, CALCIUM CHLORIDE 600; 310; 30; 20 MG/100ML; MG/100ML; MG/100ML; MG/100ML
INJECTION, SOLUTION INTRAVENOUS CONTINUOUS
Status: CANCELLED | OUTPATIENT
Start: 2023-12-28

## 2023-12-28 RX ORDER — HYDROXYZINE PAMOATE 25 MG/1
CAPSULE ORAL
COMMUNITY
Start: 2023-04-03

## 2023-12-28 RX ORDER — SERTRALINE HYDROCHLORIDE 50 MG/1
50 TABLET, FILM COATED ORAL
COMMUNITY
Start: 2023-08-25

## 2023-12-28 RX ORDER — ACETAMINOPHEN 500 MG
1000 TABLET ORAL
Status: CANCELLED | OUTPATIENT
Start: 2023-12-28 | End: 2023-12-28

## 2023-12-28 RX ORDER — LIDOCAINE HYDROCHLORIDE 10 MG/ML
0.5 INJECTION, SOLUTION EPIDURAL; INFILTRATION; INTRACAUDAL; PERINEURAL ONCE
Status: CANCELLED | OUTPATIENT
Start: 2023-12-28 | End: 2023-12-28

## 2023-12-28 RX ORDER — FLUOXETINE HYDROCHLORIDE 20 MG/1
1 CAPSULE ORAL DAILY
COMMUNITY
Start: 2023-12-15 | End: 2024-12-14

## 2023-12-28 NOTE — DISCHARGE INSTRUCTIONS
Information to Prepare you for your Surgery    PRE-ADMIT TESTING -  [phone]    [address] [facility]          Your surgery has been scheduled at [facility]. We are pleased to have the opportunity to serve you. For Further Information please call [phone].    On the day of surgery please report to the Information Desk on the 1st floor.    CONTACT YOUR PHYSICIAN'S OFFICE THE DAY PRIOR TO YOUR SURGERY TO OBTAIN YOUR ARRIVAL TIME.     The evening before surgery do not eat anything after 9 p.m. ( this includes hard candy, chewing gum and mints).  You may only have GATORADE, POWERADE AND WATER  from 9 p.m. until you leave your home.   DO NOT DRINK ANY LIQUIDS ON THE WAY TO THE HOSPITAL.      Why does your anesthesiologist allow you to drink Gatorade/Powerade before surgery?  Gatorade/Powerade helps to increase your comfort before surgery and to decrease your nausea after surgery. The carbohydrates in Gatorade/Powerade help reduce your body's stress response to surgery.  If you are a diabetic-drink only water prior to surgery.    Outpatient Surgery- May allow 2 adult (18 and older) Support Persons (1 being the designated ) for all surgical/procedural patients. A breastfeeding mother will be allowed her infant and 2 adult Support Persons. No one under the age of 18 will be allowed in the building.      SPECIAL MEDICATION INSTRUCTIONS: TAKE medications checked off by the Anesthesiologist on your Medication List.    Angiogram Patients: Take medications as instructed by your physician, including aspirin.     Surgery Patients:    If you take ASPIRIN - Your PHYSICIAN/SURGEON will need to inform you IF/OR when you need to stop taking aspirin prior to your surgery.     The week prior to surgery do not ot take any medications containing IBUPROFEN or NSAIDS ( Advil, Motrin, Goodys, BC, Aleve, Naproxen etc) If you are not sure if you should take a medicine  please call your surgeon's office.  Ok to take Tylenol    Do Not Wear any make-up (especially eye make-up) to surgery. Please remove any false eyelashes or eyelash extensions. If you arrive the day of surgery with makeup/eyelashes on you will be required to remove prior to surgery. (There is a risk of corneal abrasions if eye makeup/eyelash extensions are not removed)      Leave all valuables at home.   Do Not wear any jewelry or watches, including any metal in body piercings. Jewelry must be removed prior to coming to the hospital.  There is a possibility that rings that are unable to be removed may be cut off if they are on the surgical extremity.    Please remove all hair extensions, wigs, clips and any other metal accessories/ ornaments from your hair.  These items may pose a flammable/fire risk in Surgery and must be removed.    Do not shave your surgical area at least 5 days prior to your surgery. The surgical prep will be performed at the hospital according to Infection Control regulations.    Contact Lens must be removed before surgery. Either do not wear the contact lens or bring a case and solution for storage.  Please bring a container for eyeglasses or dentures as required.  Bring any paperwork your physician has provided, such as consent forms,  history and physicals, doctor's orders, etc.   Bring comfortable clothes that are loose fitting to wear upon discharge. Take into consideration the type of surgery being performed.  Maintain your diet as advised per your physician the day prior to surgery.      Adequate rest the night before surgery is advised.   Park in the Parking lot behind the hospital or in the [facility] across the street from the parking lot. Parking is complimentary.  If you will be discharged the same day as your procedure, please arrange for a responsible adult to drive you home or to accompany you if traveling by taxi.   YOU WILL NOT BE PERMITTED TO DRIVE OR TO LEAVE THE  HOSPITAL ALONE AFTER SURGERY.   If you are being discharged the same day, it is strongly recommended that you arrange for someone to remain with you for the first 24 hrs following your surgery.    The Surgeon will speak to your family/visitor after your surgery regarding the outcome of your surgery and post op care.  The Surgeon may speak to you after your surgery, but there is a possibility you may not remember the details.  Please check with your family members regarding the conversation with the Surgeon.    We strongly recommend whoever is bringing you home be present for discharge instructions.  This will ensure a thorough understanding for your post op home care.          Thank you for your cooperation.  The Staff of [facility].            Bathing Instructions with Hibiclens    Shower the evening before and morning of your procedure with Chlorhexidine (Hibiclens)  do not use Chlorhexidine on your face or genitals. Do not get in your eyes.  Wash your face with water and your regular face wash/soap  Use your regular shampoo  Apply Chlorhexidine (Hibiclens) directly on your skin or on a wet washcloth and wash gently. When showering: Move away from the shower stream when applying Chlorhexidine (Hibiclens) to avoid rinsing off too soon.  Rinse thoroughly with warm water  Do not dilute Chlorhexidine (Hibiclens)   Dry off as usual, do not use any deodorant, powder, body lotions, perfume, after shave or cologne.

## 2023-12-28 NOTE — ANESTHESIA PREPROCEDURE EVALUATION
12/28/2023  Dominique Huynh is a 28 y.o., female.      Pre-op Assessment    I have reviewed the Patient Summary Reports.     I have reviewed the Nursing Notes. I have reviewed the NPO Status.   I have reviewed the Medications.     Review of Systems  Anesthesia Hx:             Denies Family Hx of Anesthesia complications.   Personal Hx of Anesthesia complications, Post-Operative Nausea/Vomiting                    Social:  Non-Smoker       Hematology/Oncology:  Hematology Normal   Oncology Normal                                   Cardiovascular:  Cardiovascular Normal Exercise tolerance: good                                           Pulmonary:  Pulmonary Normal                       Renal/:  Renal/ Normal                 Hepatic/GI:  Hepatic/GI Normal                 Neurological:      Headaches                                 Endocrine:  Endocrine Normal                Physical Exam  General: Well nourished, Cooperative, Oriented and Alert    Airway:  Mallampati: II   Mouth Opening: Normal  Neck ROM: Normal ROM    Dental:  Intact, Caps / Implants        Anesthesia Plan  Type of Anesthesia, risks & benefits discussed:    Anesthesia Type: Gen ETT  Intra-op Monitoring Plan: Standard ASA Monitors  Post Op Pain Control Plan: multimodal analgesia  Induction:  IV  Airway Plan: Video, Post-Induction  Informed Consent: Informed consent signed with the Patient and all parties understand the risks and agree with anesthesia plan.  All questions answered.   ASA Score: 2  Anesthesia Plan Notes: No labs    Ready For Surgery From Anesthesia Perspective.     .

## 2024-01-05 ENCOUNTER — HOSPITAL ENCOUNTER (OUTPATIENT)
Facility: OTHER | Age: 29
Discharge: HOME | End: 2024-01-05
Attending: ORTHOPAEDIC SURGERY | Admitting: ORTHOPAEDIC SURGERY
Payer: COMMERCIAL

## 2024-01-05 ENCOUNTER — ANESTHESIA (OUTPATIENT)
Dept: SURGERY | Facility: OTHER | Age: 29
End: 2024-01-05
Payer: COMMERCIAL

## 2024-01-05 VITALS
SYSTOLIC BLOOD PRESSURE: 125 MMHG | OXYGEN SATURATION: 100 % | RESPIRATION RATE: 16 BRPM | DIASTOLIC BLOOD PRESSURE: 70 MMHG | TEMPERATURE: 98 F | HEART RATE: 95 BPM

## 2024-01-05 DIAGNOSIS — M75.111 PARTIAL NONTRAUMATIC TEAR OF RIGHT ROTATOR CUFF: ICD-10-CM

## 2024-01-05 LAB
B-HCG UR QL: NEGATIVE
CTP QC/QA: YES

## 2024-01-05 PROCEDURE — C1713 ANCHOR/SCREW BN/BN,TIS/BN: HCPCS | Performed by: ORTHOPAEDIC SURGERY

## 2024-01-05 PROCEDURE — 25000003 PHARM REV CODE 250: Performed by: ANESTHESIOLOGY

## 2024-01-05 PROCEDURE — 71000016 HC POSTOP RECOV ADDL HR: Performed by: ORTHOPAEDIC SURGERY

## 2024-01-05 PROCEDURE — D9220A PRA ANESTHESIA: Mod: ANES,,, | Performed by: ANESTHESIOLOGY

## 2024-01-05 PROCEDURE — 27201423 OPTIME MED/SURG SUP & DEVICES STERILE SUPPLY: Performed by: ORTHOPAEDIC SURGERY

## 2024-01-05 PROCEDURE — 36000710: Performed by: ORTHOPAEDIC SURGERY

## 2024-01-05 PROCEDURE — 63600175 PHARM REV CODE 636 W HCPCS: Performed by: ORTHOPAEDIC SURGERY

## 2024-01-05 PROCEDURE — 63600175 PHARM REV CODE 636 W HCPCS: Performed by: ANESTHESIOLOGY

## 2024-01-05 PROCEDURE — 81025 URINE PREGNANCY TEST: CPT | Performed by: ANESTHESIOLOGY

## 2024-01-05 PROCEDURE — 71000033 HC RECOVERY, INTIAL HOUR: Performed by: ORTHOPAEDIC SURGERY

## 2024-01-05 PROCEDURE — 37000008 HC ANESTHESIA 1ST 15 MINUTES: Performed by: ORTHOPAEDIC SURGERY

## 2024-01-05 PROCEDURE — 36000711: Performed by: ORTHOPAEDIC SURGERY

## 2024-01-05 PROCEDURE — 71000015 HC POSTOP RECOV 1ST HR: Performed by: ORTHOPAEDIC SURGERY

## 2024-01-05 PROCEDURE — 63600175 PHARM REV CODE 636 W HCPCS: Performed by: NURSE ANESTHETIST, CERTIFIED REGISTERED

## 2024-01-05 PROCEDURE — 37000009 HC ANESTHESIA EA ADD 15 MINS: Performed by: ORTHOPAEDIC SURGERY

## 2024-01-05 PROCEDURE — 71000039 HC RECOVERY, EACH ADD'L HOUR: Performed by: ORTHOPAEDIC SURGERY

## 2024-01-05 PROCEDURE — D9220A PRA ANESTHESIA: Mod: CRNA,,, | Performed by: NURSE ANESTHETIST, CERTIFIED REGISTERED

## 2024-01-05 PROCEDURE — 25000003 PHARM REV CODE 250: Performed by: NURSE ANESTHETIST, CERTIFIED REGISTERED

## 2024-01-05 DEVICE — IMPLANTABLE DEVICE: Type: IMPLANTABLE DEVICE | Site: SHOULDER | Status: FUNCTIONAL

## 2024-01-05 RX ORDER — SODIUM CHLORIDE, SODIUM LACTATE, POTASSIUM CHLORIDE, CALCIUM CHLORIDE 600; 310; 30; 20 MG/100ML; MG/100ML; MG/100ML; MG/100ML
INJECTION, SOLUTION INTRAVENOUS CONTINUOUS
Status: DISCONTINUED | OUTPATIENT
Start: 2024-01-05 | End: 2024-01-05 | Stop reason: HOSPADM

## 2024-01-05 RX ORDER — LIDOCAINE HYDROCHLORIDE 10 MG/ML
0.5 INJECTION, SOLUTION EPIDURAL; INFILTRATION; INTRACAUDAL; PERINEURAL ONCE
Status: DISCONTINUED | OUTPATIENT
Start: 2024-01-05 | End: 2024-01-05 | Stop reason: HOSPADM

## 2024-01-05 RX ORDER — EPINEPHRINE 1 MG/ML
INJECTION, SOLUTION, CONCENTRATE INTRAVENOUS
Status: DISCONTINUED | OUTPATIENT
Start: 2024-01-05 | End: 2024-01-05 | Stop reason: HOSPADM

## 2024-01-05 RX ORDER — KETOROLAC TROMETHAMINE 30 MG/ML
INJECTION, SOLUTION INTRAMUSCULAR; INTRAVENOUS
Status: DISCONTINUED | OUTPATIENT
Start: 2024-01-05 | End: 2024-01-05

## 2024-01-05 RX ORDER — SODIUM CHLORIDE 0.9 % (FLUSH) 0.9 %
5 SYRINGE (ML) INJECTION
Status: DISCONTINUED | OUTPATIENT
Start: 2024-01-05 | End: 2024-01-05 | Stop reason: HOSPADM

## 2024-01-05 RX ORDER — MEPERIDINE HYDROCHLORIDE 25 MG/ML
12.5 INJECTION INTRAMUSCULAR; INTRAVENOUS; SUBCUTANEOUS ONCE AS NEEDED
Status: DISCONTINUED | OUTPATIENT
Start: 2024-01-05 | End: 2024-01-05 | Stop reason: HOSPADM

## 2024-01-05 RX ORDER — ONDANSETRON 2 MG/ML
INJECTION INTRAMUSCULAR; INTRAVENOUS
Status: DISCONTINUED | OUTPATIENT
Start: 2024-01-05 | End: 2024-01-05

## 2024-01-05 RX ORDER — PROPOFOL 10 MG/ML
VIAL (ML) INTRAVENOUS CONTINUOUS PRN
Status: DISCONTINUED | OUTPATIENT
Start: 2024-01-05 | End: 2024-01-05

## 2024-01-05 RX ORDER — CYCLOBENZAPRINE HCL 5 MG
10 TABLET ORAL ONCE
Status: DISCONTINUED | OUTPATIENT
Start: 2024-01-05 | End: 2024-01-05

## 2024-01-05 RX ORDER — PROCHLORPERAZINE EDISYLATE 5 MG/ML
5 INJECTION INTRAMUSCULAR; INTRAVENOUS EVERY 30 MIN PRN
Status: DISCONTINUED | OUTPATIENT
Start: 2024-01-05 | End: 2024-01-05 | Stop reason: HOSPADM

## 2024-01-05 RX ORDER — HYDROMORPHONE HYDROCHLORIDE 2 MG/ML
0.4 INJECTION, SOLUTION INTRAMUSCULAR; INTRAVENOUS; SUBCUTANEOUS EVERY 5 MIN PRN
Status: DISCONTINUED | OUTPATIENT
Start: 2024-01-05 | End: 2024-01-05 | Stop reason: HOSPADM

## 2024-01-05 RX ORDER — DEXAMETHASONE SODIUM PHOSPHATE 4 MG/ML
INJECTION, SOLUTION INTRA-ARTICULAR; INTRALESIONAL; INTRAMUSCULAR; INTRAVENOUS; SOFT TISSUE
Status: DISCONTINUED | OUTPATIENT
Start: 2024-01-05 | End: 2024-01-05

## 2024-01-05 RX ORDER — PROCHLORPERAZINE EDISYLATE 5 MG/ML
INJECTION INTRAMUSCULAR; INTRAVENOUS
Status: DISCONTINUED | OUTPATIENT
Start: 2024-01-05 | End: 2024-01-05

## 2024-01-05 RX ORDER — ACETAMINOPHEN 500 MG
1000 TABLET ORAL
Status: COMPLETED | OUTPATIENT
Start: 2024-01-05 | End: 2024-01-05

## 2024-01-05 RX ORDER — SODIUM CHLORIDE 0.9 % (FLUSH) 0.9 %
3 SYRINGE (ML) INJECTION
Status: DISCONTINUED | OUTPATIENT
Start: 2024-01-05 | End: 2024-01-05 | Stop reason: HOSPADM

## 2024-01-05 RX ORDER — ROCURONIUM BROMIDE 10 MG/ML
INJECTION, SOLUTION INTRAVENOUS
Status: DISCONTINUED | OUTPATIENT
Start: 2024-01-05 | End: 2024-01-05

## 2024-01-05 RX ORDER — PHENYLEPHRINE HYDROCHLORIDE 10 MG/ML
INJECTION INTRAVENOUS
Status: DISCONTINUED | OUTPATIENT
Start: 2024-01-05 | End: 2024-01-05

## 2024-01-05 RX ORDER — OXYCODONE HYDROCHLORIDE 5 MG/1
5 TABLET ORAL EVERY 4 HOURS PRN
Status: DISCONTINUED | OUTPATIENT
Start: 2024-01-05 | End: 2024-01-05 | Stop reason: HOSPADM

## 2024-01-05 RX ORDER — MIDAZOLAM HYDROCHLORIDE 1 MG/ML
INJECTION INTRAMUSCULAR; INTRAVENOUS
Status: DISCONTINUED | OUTPATIENT
Start: 2024-01-05 | End: 2024-01-05

## 2024-01-05 RX ORDER — CEFAZOLIN SODIUM 1 G/3ML
2 INJECTION, POWDER, FOR SOLUTION INTRAMUSCULAR; INTRAVENOUS
Status: COMPLETED | OUTPATIENT
Start: 2024-01-05 | End: 2024-01-05

## 2024-01-05 RX ORDER — FENTANYL CITRATE 50 UG/ML
INJECTION, SOLUTION INTRAMUSCULAR; INTRAVENOUS
Status: DISCONTINUED | OUTPATIENT
Start: 2024-01-05 | End: 2024-01-05

## 2024-01-05 RX ORDER — ONDANSETRON 4 MG/1
8 TABLET, ORALLY DISINTEGRATING ORAL EVERY 8 HOURS PRN
Qty: 20 TABLET | Refills: 0 | Status: SHIPPED | OUTPATIENT
Start: 2024-01-05

## 2024-01-05 RX ORDER — DIPHENHYDRAMINE HYDROCHLORIDE 50 MG/ML
12.5 INJECTION, SOLUTION INTRAMUSCULAR; INTRAVENOUS EVERY 30 MIN PRN
Status: DISCONTINUED | OUTPATIENT
Start: 2024-01-05 | End: 2024-01-05 | Stop reason: HOSPADM

## 2024-01-05 RX ORDER — CYCLOBENZAPRINE HCL 5 MG
10 TABLET ORAL ONCE AS NEEDED
Status: COMPLETED | OUTPATIENT
Start: 2024-01-05 | End: 2024-01-05

## 2024-01-05 RX ORDER — SODIUM CHLORIDE 9 MG/ML
INJECTION, SOLUTION INTRAVENOUS CONTINUOUS
Status: ACTIVE | OUTPATIENT
Start: 2024-01-05

## 2024-01-05 RX ORDER — PROPOFOL 10 MG/ML
VIAL (ML) INTRAVENOUS
Status: DISCONTINUED | OUTPATIENT
Start: 2024-01-05 | End: 2024-01-05

## 2024-01-05 RX ORDER — HYDROCODONE BITARTRATE AND ACETAMINOPHEN 10; 325 MG/1; MG/1
1 TABLET ORAL EVERY 6 HOURS PRN
Qty: 30 TABLET | Refills: 0 | Status: SHIPPED | OUTPATIENT
Start: 2024-01-05

## 2024-01-05 RX ORDER — SCOLOPAMINE TRANSDERMAL SYSTEM 1 MG/1
PATCH, EXTENDED RELEASE TRANSDERMAL
Status: DISCONTINUED | OUTPATIENT
Start: 2024-01-05 | End: 2024-01-05

## 2024-01-05 RX ORDER — ROPIVACAINE HYDROCHLORIDE 5 MG/ML
INJECTION, SOLUTION EPIDURAL; INFILTRATION; PERINEURAL
Status: DISCONTINUED | OUTPATIENT
Start: 2024-01-05 | End: 2024-01-05 | Stop reason: HOSPADM

## 2024-01-05 RX ORDER — LIDOCAINE HYDROCHLORIDE 20 MG/ML
INJECTION INTRAVENOUS
Status: DISCONTINUED | OUTPATIENT
Start: 2024-01-05 | End: 2024-01-05

## 2024-01-05 RX ADMIN — HYDROMORPHONE HYDROCHLORIDE 0.4 MG: 2 INJECTION INTRAMUSCULAR; INTRAVENOUS; SUBCUTANEOUS at 08:01

## 2024-01-05 RX ADMIN — PHENYLEPHRINE HYDROCHLORIDE 100 MCG: 10 INJECTION INTRAVENOUS at 07:01

## 2024-01-05 RX ADMIN — PROPOFOL 75 MCG/KG/MIN: 10 INJECTION, EMULSION INTRAVENOUS at 07:01

## 2024-01-05 RX ADMIN — ROCURONIUM BROMIDE 50 MG: 10 SOLUTION INTRAVENOUS at 07:01

## 2024-01-05 RX ADMIN — SUGAMMADEX 200 MG: 100 INJECTION, SOLUTION INTRAVENOUS at 07:01

## 2024-01-05 RX ADMIN — ONDANSETRON HYDROCHLORIDE 4 MG: 2 INJECTION INTRAMUSCULAR; INTRAVENOUS at 07:01

## 2024-01-05 RX ADMIN — GLYCOPYRROLATE 0.2 MG: 0.2 INJECTION, SOLUTION INTRAMUSCULAR; INTRAVITREAL at 08:01

## 2024-01-05 RX ADMIN — LIDOCAINE HYDROCHLORIDE 100 MG: 20 INJECTION, SOLUTION INTRAVENOUS at 07:01

## 2024-01-05 RX ADMIN — SCOPALAMINE 1 PATCH: 1 PATCH, EXTENDED RELEASE TRANSDERMAL at 06:01

## 2024-01-05 RX ADMIN — DEXAMETHASONE SODIUM PHOSPHATE 8 MG: 4 INJECTION, SOLUTION INTRAMUSCULAR; INTRAVENOUS at 07:01

## 2024-01-05 RX ADMIN — CEFAZOLIN 2 G: 330 INJECTION, POWDER, FOR SOLUTION INTRAMUSCULAR; INTRAVENOUS at 07:01

## 2024-01-05 RX ADMIN — OXYCODONE HYDROCHLORIDE 5 MG: 5 TABLET ORAL at 08:01

## 2024-01-05 RX ADMIN — KETOROLAC TROMETHAMINE 30 MG: 30 INJECTION, SOLUTION INTRAMUSCULAR; INTRAVENOUS at 07:01

## 2024-01-05 RX ADMIN — ACETAMINOPHEN 1000 MG: 500 TABLET ORAL at 05:01

## 2024-01-05 RX ADMIN — PHENYLEPHRINE HYDROCHLORIDE 0.25 MCG/KG/MIN: 10 INJECTION INTRAVENOUS at 07:01

## 2024-01-05 RX ADMIN — CYCLOBENZAPRINE HYDROCHLORIDE 10 MG: 5 TABLET, FILM COATED ORAL at 09:01

## 2024-01-05 RX ADMIN — SODIUM CHLORIDE, SODIUM LACTATE, POTASSIUM CHLORIDE, AND CALCIUM CHLORIDE: 600; 310; 30; 20 INJECTION, SOLUTION INTRAVENOUS at 06:01

## 2024-01-05 RX ADMIN — FENTANYL CITRATE 100 MCG: 50 INJECTION, SOLUTION INTRAMUSCULAR; INTRAVENOUS at 07:01

## 2024-01-05 RX ADMIN — MIDAZOLAM HYDROCHLORIDE 2 MG: 1 INJECTION, SOLUTION INTRAMUSCULAR; INTRAVENOUS at 06:01

## 2024-01-05 RX ADMIN — PROPOFOL 200 MG: 10 INJECTION, EMULSION INTRAVENOUS at 07:01

## 2024-01-05 RX ADMIN — PROCHLORPERAZINE EDISYLATE 2.5 MG: 5 INJECTION INTRAMUSCULAR; INTRAVENOUS at 07:01

## 2024-01-05 NOTE — TRANSFER OF CARE
Anesthesia Transfer of Care Note    Patient: Dominique Jean Lino    Procedure(s) Performed: Procedure(s) (LRB):  REPAIR, ROTATOR CUFF PARTIAL THICKNESS (Left)  DEBRIDEMENT, SHOULDER, ARTHROSCOPIC (Left)    Patient location: PACU    Anesthesia Type: general    Transport from OR: Transported from OR on 6-10 L/min O2 by face mask with adequate spontaneous ventilation    Post pain: adequate analgesia    Post assessment: no apparent anesthetic complications and tolerated procedure well    Post vital signs: stable    Level of consciousness: awake    Nausea/Vomiting: no nausea/vomiting    Complications: none    Transfer of care protocol was followed      Last vitals: Visit Vitals  BP (!) 107/58 (BP Location: Right arm, Patient Position: Lying)   Pulse (!) 48   Temp 36.3 °C (97.4 °F) (Temporal)   Resp 18   LMP 12/05/2023   SpO2 100%   Breastfeeding No

## 2024-01-05 NOTE — ANESTHESIA POSTPROCEDURE EVALUATION
Anesthesia Post Evaluation    Patient: Dominique Jean Lino    Procedure(s) Performed: Procedure(s) (LRB):  REPAIR, ROTATOR CUFF PARTIAL THICKNESS (Left)  DEBRIDEMENT, SHOULDER, ARTHROSCOPIC (Left)    Final Anesthesia Type: general      Patient location during evaluation: PACU  Patient participation: Yes- Able to Participate  Level of consciousness: awake and alert  Post-procedure vital signs: reviewed and stable  Pain management: adequate  Airway patency: patent  MIRA mitigation strategies: Extubation while patient is awake  PONV status at discharge: No PONV  Anesthetic complications: no      Cardiovascular status: hemodynamically stable  Respiratory status: unassisted  Hydration status: euvolemic  Follow-up not needed.              Vitals Value Taken Time   /58 01/05/24 0758   Temp 36.3 °C (97.4 °F) 01/05/24 0758   Pulse 48 01/05/24 0758   Resp 18 01/05/24 0758   SpO2 100 % 01/05/24 0758         No case tracking events are documented in the log.      Pain/Samira Score: Pain Rating Prior to Med Admin: 0 (1/5/2024  5:51 AM)  Samira Score: 4 (1/5/2024  7:58 AM)          
To get better and follow your care plan as instructed.

## 2024-01-05 NOTE — OP NOTE
[facility] Surgery ([facility]  Surgery Department  Operative Note    SUMMARY     Date of Procedure: 1/5/2024     Procedure: Procedure(s) (LRB):  REPAIR, ROTATOR CUFF PARTIAL THICKNESS (Left)  DEBRIDEMENT, SHOULDER, ARTHROSCOPIC (Left)     Surgeon(s) and Role:     * Justo Rivas MD - Primary    Assisting Surgeon: None    Pre-Operative Diagnosis: Tear of left rotator cuff, unspecified tear extent, unspecified whether traumatic [M75.102]  Complete tear of left rotator cuff, unspecified whether traumatic [M75.122]    Post-Operative Diagnosis: Post-Op Diagnosis Codes:     * Tear of left rotator cuff, unspecified tear extent, unspecified whether traumatic [M75.102]     * Complete tear of left rotator cuff, unspecified whether traumatic [M75.122]    Anesthesia: General    Operative Findings (including complications, if any):  Partial rotator cuff tear supraspinatus    Description of Technical Procedures:  Patient brought the operating room and a general anesthesia without difficulty.  Careful placed in the beach chair position exam anesthesia full range of motion no instability.  Positioned appropriately prepped in usual fashion.  Using posterior viewing portal examination as follows glenohumeral joint looked good labrum was good no chondromalacia biceps looked good anchored well.  Undersurface rotator cuff showed some hyperemia irritation undersurface of the supraspinatus otherwise clear.  Attention turned to subacromial space with posterior viewing portal lateral working portal she would good clear space she did not have any significant impingement or downsloping of the acromion.  She had some mild bursitis laterally.  This was lightly cleaned up and you could see where her rotator cuff was thin partial tear from the supraspinatus.  This coincided with her MRI findings.  So with a small lateral incision was self-retaining retractors were small deltoid split the rotator cuff was further inspected.  The thin portion  of the rotator cuff was identified and a 1 out anchor was placed with multiple horizontal mattress sutures to repair/reinforce that area.  I did not take down the rotator cuff.  But repaired it with regular needles through the thin area.  Laid down nice and flat.  PRP was integrated in the repair.  Instruments removed.  0 Vicryl using small deltoid split through Monocryl subcuticularly Steri-Strips on skin.  0.5% ropivacaine was instilled the soft tissues placed in a sterile bandage simple arm sling brought to come sterile fashion     This performed with a poor recognition system.    Significant Surgical Tasks Conducted by the Assistant(s), if Applicable:  Positioning    Estimated Blood Loss (EBL): * No values recorded between 1/5/2024  7:16 AM and 1/5/2024  7:56 AM * minimal           Implants:   Implant Name Type Inv. Item Serial No.  Lot No. LRB No. Used Action   Y-KNOT RC WITH NEEDLES ALL-SUTURE ANCHOR, WITH THREE #2 HI-FI SUTURES    yoone [id number] Left 1 Implanted       Specimens:   Specimen (24h ago, onward)      None                    Condition: Good    Disposition: PACU - hemodynamically stable.    Attestation: I was present and scrubbed for the entire procedure.

## 2024-01-05 NOTE — DISCHARGE INSTRUCTIONS
Polar Care Cube Instructions                          Remove Scopolamine patch on post op day 2.    Ex (surgery on Mon, remove on Wed)    Wash hands thoroughly after removing patch and wash area where patch was placed.    Fold in half and discard in garbage.

## 2024-01-05 NOTE — ANESTHESIA PROCEDURE NOTES
Intubation    Date/Time: 1/5/2024 7:03 AM    Performed by: Rosi Rivers  Authorized by: Corey Calvo MD    Intubation:     Induction:  Intravenous    Intubated:  Postinduction    Mask Ventilation:  Easy mask    Attempts:  1    Attempted By:  CRNA    Method of Intubation:  Video laryngoscopy    Blade:  Morillo 3    Laryngeal View Grade: Grade I - full view of cords      Difficult Airway Encountered?: No      Complications:  None    Airway Device:  Oral endotracheal tube    Airway Device Size:  7.0    Style/Cuff Inflation:  Cuffed (inflated to minimal occlusive pressure)    Tube secured:  20    Secured at:  The lips    Placement Verified By:  Capnometry    Complicating Factors:  None    Findings Post-Intubation:  BS equal bilateral and atraumatic/condition of teeth unchanged

## 2024-01-05 NOTE — PLAN OF CARE
Dominique Huynh has met all discharge criteria from Phase II. Vital Signs are stable, ambulating  without difficulty. Discharge instructions given, patient verbalized understanding. Discharged from facility via wheelchair in stable condition.

## 2024-02-26 DIAGNOSIS — N93.8 DUB (DYSFUNCTIONAL UTERINE BLEEDING): ICD-10-CM

## 2024-02-26 RX ORDER — NORETHINDRONE ACETATE AND ETHINYL ESTRADIOL 1.5-30(21)
1 KIT ORAL DAILY
Qty: 84 TABLET | Refills: 4 | Status: SHIPPED | OUTPATIENT
Start: 2024-02-26

## 2024-02-26 NOTE — TELEPHONE ENCOUNTER
----- Message from Ruba Almonte sent at 2/26/2024  8:50 AM CST -----  Who Called:  UUM LE [[id number]]              New Prescription of Refill:  Refill        RX Name and Strength:  JUNEL FE 1.5/30, 28, 1.5 mg-30 mcg (21)/75 mg (7) tablet          30 day or 90 day RX:  30 day           Local or Mail Order:local             Preferred Pharmacy:[facility] [address] [place] [address] [[id number]]            Would the patient rather a call back or a response via [facility]?  Call back         Best call back number:[phone]          Additional Information:

## 2024-07-22 ENCOUNTER — OFFICE VISIT (OUTPATIENT)
Dept: OBSTETRICS AND GYNECOLOGY | Facility: CLINIC | Age: 29
End: 2024-07-22
Payer: COMMERCIAL

## 2024-07-22 ENCOUNTER — LAB VISIT (OUTPATIENT)
Dept: LAB | Facility: OTHER | Age: 29
End: 2024-07-22
Payer: COMMERCIAL

## 2024-07-22 VITALS
HEIGHT: 62 IN | WEIGHT: 145.5 LBS | SYSTOLIC BLOOD PRESSURE: 100 MMHG | DIASTOLIC BLOOD PRESSURE: 52 MMHG | BODY MASS INDEX: 26.78 KG/M2

## 2024-07-22 DIAGNOSIS — A64 SEXUALLY TRANSMITTED DISEASE (STD): ICD-10-CM

## 2024-07-22 DIAGNOSIS — N93.8 DUB (DYSFUNCTIONAL UTERINE BLEEDING): ICD-10-CM

## 2024-07-22 DIAGNOSIS — Z01.419 WELL WOMAN EXAM WITH ROUTINE GYNECOLOGICAL EXAM: Primary | ICD-10-CM

## 2024-07-22 DIAGNOSIS — Z30.9 ENCOUNTER FOR CONTRACEPTIVE MANAGEMENT, UNSPECIFIED TYPE: ICD-10-CM

## 2024-07-22 DIAGNOSIS — N80.9 ENDOMETRIOSIS: ICD-10-CM

## 2024-07-22 LAB
HIV 1+2 AB+HIV1 P24 AG SERPL QL IA: NEGATIVE
TREPONEMA PALLIDUM IGG+IGM AB [PRESENCE] IN SERUM OR PLASMA BY IMMUNOASSAY: NONREACTIVE

## 2024-07-22 PROCEDURE — 3078F DIAST BP <80 MM HG: CPT | Mod: CPTII,S$GLB,, | Performed by: OBSTETRICS & GYNECOLOGY

## 2024-07-22 PROCEDURE — 99999 PR PBB SHADOW E&M-EST. PATIENT-LVL III: CPT | Mod: PBBFAC,,, | Performed by: OBSTETRICS & GYNECOLOGY

## 2024-07-22 PROCEDURE — 87624 HPV HI-RISK TYP POOLED RSLT: CPT

## 2024-07-22 PROCEDURE — 88175 CYTOPATH C/V AUTO FLUID REDO: CPT

## 2024-07-22 PROCEDURE — 87389 HIV-1 AG W/HIV-1&-2 AB AG IA: CPT

## 2024-07-22 PROCEDURE — 86593 SYPHILIS TEST NON-TREP QUANT: CPT

## 2024-07-22 PROCEDURE — 3008F BODY MASS INDEX DOCD: CPT | Mod: CPTII,S$GLB,, | Performed by: OBSTETRICS & GYNECOLOGY

## 2024-07-22 PROCEDURE — 87591 N.GONORRHOEAE DNA AMP PROB: CPT

## 2024-07-22 PROCEDURE — 36415 COLL VENOUS BLD VENIPUNCTURE: CPT

## 2024-07-22 PROCEDURE — 99459 PELVIC EXAMINATION: CPT | Mod: S$GLB,,, | Performed by: OBSTETRICS & GYNECOLOGY

## 2024-07-22 PROCEDURE — 1160F RVW MEDS BY RX/DR IN RCRD: CPT | Mod: CPTII,S$GLB,, | Performed by: OBSTETRICS & GYNECOLOGY

## 2024-07-22 PROCEDURE — 81514 NFCT DS BV&VAGINITIS DNA ALG: CPT

## 2024-07-22 PROCEDURE — 3074F SYST BP LT 130 MM HG: CPT | Mod: CPTII,S$GLB,, | Performed by: OBSTETRICS & GYNECOLOGY

## 2024-07-22 PROCEDURE — 99395 PREV VISIT EST AGE 18-39: CPT | Mod: S$GLB,,, | Performed by: OBSTETRICS & GYNECOLOGY

## 2024-07-22 PROCEDURE — 87491 CHLMYD TRACH DNA AMP PROBE: CPT

## 2024-07-22 PROCEDURE — 1159F MED LIST DOCD IN RCRD: CPT | Mod: CPTII,S$GLB,, | Performed by: OBSTETRICS & GYNECOLOGY

## 2024-07-22 RX ORDER — NORETHINDRONE ACETATE AND ETHINYL ESTRADIOL 1.5-30(21)
1 KIT ORAL DAILY
Qty: 84 TABLET | Refills: 4 | Status: SHIPPED | OUTPATIENT
Start: 2024-07-22

## 2024-07-22 NOTE — PROGRESS NOTES
"  Past medical, surgical, social, family, and obstetric histories; medications; prior records and results; and available outside records were reviewed and updated in the EMR.  Pertinent findings were noted below.    Reason for Visit   Gynecologic Exam ( To discuss birth control )    HPI   28 y.o. female  who presents for WWE    History of abnormal paps: DENIES  Abnormal or postmenopausal bleeding: DENIES  History of abnormal mammograms:N/A   Family history of breast or ovarian cancer: DENIES  Any breast masses, pain, skin changes, or nipple discharge: DENIES  Possible recent STD exposure: No  Contraception: None    Pap: 2024, Done today  Allergies: Pork/porcine containing products    Review of Systems   Constitutional:  Negative for fever.   Respiratory:  Negative for shortness of breath.    Cardiovascular:  Negative for chest pain.   Gastrointestinal:  Negative for abdominal pain, nausea and vomiting.   Endocrine: Negative for hot flashes.   Genitourinary:  Positive for pelvic pain and vaginal bleeding. Negative for menstrual problem and vaginal pain.   Integumentary:  Negative for breast mass, nipple discharge and breast skin changes.   Neurological:  Negative for headaches.   Hematological:  Does not bruise/bleed easily.   Psychiatric/Behavioral:  Negative for depression.    Breast: Negative for mass, mastodynia, nipple discharge and skin changes      Exam   BP (!) 100/52   Ht 5' 2" (1.575 m)   Wt 66 kg (145 lb 8.1 oz)   LMP 2024 (Exact Date)   BMI 26.61 kg/m²     Physical Exam  Constitutional:       Appearance: Normal appearance.     Genitourinary:    Vulva and uterus normal.   There is bleeding in the vagina. Right adnexum displays tenderness (mild). Left adnexum displays tenderness (mild). Cervix is normal. Breasts:     Breasts are soft.  HENT:      Head: Atraumatic.      Nose: Nose normal.   Eyes:      Extraocular Movements: Extraocular movements intact.   Pulmonary:      Effort: " Pulmonary effort is normal. No respiratory distress.   Abdominal:      General: There is no distension.      Tenderness: There is no abdominal tenderness. There is no guarding or rebound.   Musculoskeletal:         General: Normal range of motion.   Neurological:      Mental Status: She is alert and oriented to person, place, and time.   Psychiatric:         Mood and Affect: Mood normal.         Thought Content: Thought content normal.   Exam conducted with a chaperone present.       Assessment and Plan   Well woman exam with routine gynecological exam  -     Liquid-Based Pap Smear, Screening  -     HPV High Risk Genotypes, PCR    Encounter for contraceptive management, unspecified type  - Will restart OCP. Patient denied contraindications. The use of the oral contraceptive has been fully discussed with the patient. This includes the proper method to initiate and continue the pills, the need for regular compliance to ensure adequate contraceptive effect, the physiology which make the pill effective, the instructions for what to do in event of a missed pill, and warnings about anticipated minor side effects such as breakthrough spotting, nausea, breast tenderness, weight changes, acne, headaches, etc.  She has been told of the more serious potential side effects such as MI, stroke, and deep vein thrombosis, all of which are very unlikely.  She has been asked to report any signs of such serious problems immediately.  She should back up the pill with a condom during any cycle in which antibiotics are prescribed, and during the first cycle as well. The need for additional protection, such as a condom, to prevent exposure to sexually transmitted diseases has also been discussed- the patient has been clearly reminded that OCP's cannot protect her against diseases such as HIV and others. She understands and wishes to take the medication as prescribed.     Endometriosis  -     norethindrone-ethinyl estradiol-iron (JUNEL FE  1.5/30, 28,) 1.5 mg-30 mcg (21)/75 mg (7) tablet; Take 1 tablet by mouth once daily.  Dispense: 84 tablet; Refill: 4    Sexually transmitted disease (STD)  -     C. trachomatis/N. gonorrhoeae by AMP DNA [facility]; Cervicovaginal  -     Vaginosis Screen by DNA Probe  -     HIV 1/2 Ag/Ab (4th Gen); Future; Expected date: 07/22/2024  -     Treponema Pallidium Antibodies IgG, IgM; Future; Expected date: 07/22/2024    She was counseled to follow up with her PCP for other routine health maintenance    Chivo Patterson MD PGY-3  Obstetrics and Gynecology

## 2024-07-23 LAB
BACTERIAL VAGINOSIS DNA: NEGATIVE
C TRACH DNA SPEC QL NAA+PROBE: NOT DETECTED
CANDIDA GLABRATA DNA: NEGATIVE
CANDIDA KRUSEI DNA: NEGATIVE
CANDIDA RRNA VAG QL PROBE: NEGATIVE
CLINICAL INFO: NORMAL
DATE OF PREVIOUS PAP: NORMAL
DATE PREVIOUS BX: NO
LMP START DATE: NORMAL
N GONORRHOEA DNA SPEC QL NAA+PROBE: NOT DETECTED
SPECIMEN SOURCE CVX/VAG CYTO: NORMAL
T VAGINALIS RRNA GENITAL QL PROBE: NEGATIVE

## 2025-06-09 ENCOUNTER — OFFICE VISIT (OUTPATIENT)
Dept: URGENT CARE | Facility: CLINIC | Age: 30
End: 2025-06-09
Payer: COMMERCIAL

## 2025-06-09 VITALS
OXYGEN SATURATION: 99 % | SYSTOLIC BLOOD PRESSURE: 97 MMHG | TEMPERATURE: 99 F | WEIGHT: 144.75 LBS | DIASTOLIC BLOOD PRESSURE: 65 MMHG | HEART RATE: 61 BPM | RESPIRATION RATE: 20 BRPM | HEIGHT: 62 IN | BODY MASS INDEX: 26.64 KG/M2

## 2025-06-09 DIAGNOSIS — L23.9 ALLERGIC DERMATITIS: Primary | ICD-10-CM

## 2025-06-09 DIAGNOSIS — L29.9 ITCHING: ICD-10-CM

## 2025-06-09 PROCEDURE — 99213 OFFICE O/P EST LOW 20 MIN: CPT | Mod: 25,S$GLB,, | Performed by: NURSE PRACTITIONER

## 2025-06-09 PROCEDURE — 96372 THER/PROPH/DIAG INJ SC/IM: CPT | Mod: S$GLB,,, | Performed by: NURSE PRACTITIONER

## 2025-06-09 RX ORDER — HYDROXYZINE HYDROCHLORIDE 25 MG/1
25 TABLET, FILM COATED ORAL 3 TIMES DAILY PRN
Qty: 20 TABLET | Refills: 0 | Status: SHIPPED | OUTPATIENT
Start: 2025-06-09

## 2025-06-09 RX ORDER — FLUOXETINE 20 MG/1
60 CAPSULE ORAL
COMMUNITY

## 2025-06-09 RX ORDER — PREDNISONE 20 MG/1
TABLET ORAL
Qty: 20 TABLET | Refills: 0 | Status: SHIPPED | OUTPATIENT
Start: 2025-06-09 | End: 2025-06-25

## 2025-06-09 RX ORDER — PRAZOSIN HYDROCHLORIDE 5 MG/1
5 CAPSULE ORAL NIGHTLY
COMMUNITY

## 2025-06-09 RX ORDER — HYDROCORTISONE 25 MG/G
CREAM TOPICAL 2 TIMES DAILY
Qty: 28 G | Refills: 0 | Status: SHIPPED | OUTPATIENT
Start: 2025-06-09

## 2025-06-09 RX ORDER — DEXAMETHASONE SODIUM PHOSPHATE 10 MG/ML
10 INJECTION INTRAMUSCULAR; INTRAVENOUS
Status: COMPLETED | OUTPATIENT
Start: 2025-06-09 | End: 2025-06-09

## 2025-06-09 RX ORDER — BUPROPION HYDROCHLORIDE 150 MG/1
150 TABLET ORAL EVERY MORNING
COMMUNITY
Start: 2025-01-17

## 2025-06-09 RX ADMIN — DEXAMETHASONE SODIUM PHOSPHATE 10 MG: 10 INJECTION INTRAMUSCULAR; INTRAVENOUS at 12:06

## 2025-06-09 NOTE — LETTER
June 9, 2025      [facility] [address] [address]  Phone: [phone]  Fax: [phone]       Patient: Dominique Huynh   YOB: 1995  Date of Visit: 06/09/2025    To Whom It May Concern:    Yuni Huynh  was at [facility] on 06/09/2025. The patient may return to work/school on 6/10/2025 with no restrictions. If you have any questions or concerns, or if I can be of further assistance, please do not hesitate to contact me.    Sincerely,      RAMÓN KatzC

## 2025-06-09 NOTE — PROGRESS NOTES
"Subjective:      Patient ID: Dominique Huynh is a 29 y.o. female.    Vitals:  height is 5' 2" (1.575 m) and weight is 65.6 kg (144 lb 11.7 oz). Her oral temperature is 99.4 °F (37.4 °C). Her blood pressure is 97/65 and her pulse is 61. Her respiration is 20 and oxygen saturation is 99%.     Chief Complaint: Allergic Reaction    Patient is a 29 y.o. female who presents today with a chief complaint of allergic reaction, hives on the neck that started last night, but it's started to spread, to her face and is itching everywhere. Pt took benadryl and used benadryl cream last night , last intake was this morning. Pt states that she was messing with plants yesterday. Pt presents with scratching her face and hair.       Allergic Reaction  This is a new problem. The current episode started yesterday. The problem has been gradually worsening since onset. Associated with: plants. The exposure occurred at Home. Associated symptoms include itching and a rash. Pertinent negatives include no chest pressure, coughing, difficulty breathing, eye itching, eye redness, eye watering, skin blistering, trouble swallowing or vomiting. Treatments tried: benadryl and benadryl cream. The treatment provided no relief. There is no swelling present.     Constitution: Negative for sweating, fatigue and fever.   HENT:  Negative for trouble swallowing.    Eyes:  Negative for eye itching and eye redness.   Respiratory:  Negative for cough and shortness of breath.    Gastrointestinal:  Negative for vomiting.   Skin:  Positive for rash. Negative for erythema.      Objective:     Physical Exam   Constitutional: She is oriented to person, place, and time. She appears well-developed.   HENT:   Head: Normocephalic and atraumatic. Head is without abrasion, without contusion and without laceration.   Ears:   Right Ear: External ear normal.   Left Ear: External ear normal.   Nose: Nose normal.   Mouth/Throat: Oropharynx is clear and moist " and mucous membranes are normal.   Eyes: Conjunctivae, EOM and lids are normal. Pupils are equal, round, and reactive to light.   Neck: Trachea normal and phonation normal. Neck supple.   Cardiovascular: Normal rate, regular rhythm and normal heart sounds.   Pulmonary/Chest: Effort normal and breath sounds normal. No stridor. No respiratory distress.   Musculoskeletal: Normal range of motion.         General: Normal range of motion.   Neurological: She is alert and oriented to person, place, and time.   Skin: Skin is warm, dry, intact and rash. Capillary refill takes less than 2 seconds. No abrasion, No burn, No bruising, No erythema and No ecchymosis        Psychiatric: Her speech is normal and behavior is normal. Judgment and thought content normal.   Nursing note and vitals reviewed.            Assessment:     1. Allergic dermatitis    2. Itching        Plan:     Treating for allergic dermatitis. Given Decadron injection clinic. Instructed to start oral antihistamine such as Zyrtec or Claritin. Apply topical steroid cream as needed to affected areas. Start oral steroids if symptoms persist in 2 days. Given strict ED precautions for worsening symptoms such as difficulty breathing or swallowing.    Allergic dermatitis  -     dexAMETHasone injection 10 mg  -     hydrocortisone 2.5 % cream; Apply topically 2 (two) times daily.  Dispense: 28 g; Refill: 0  -     predniSONE (DELTASONE) 20 MG tablet; Take 2 tablets (40 mg total) by mouth once daily for 4 days, THEN 1.5 tablets (30 mg total) once daily for 4 days, THEN 1 tablet (20 mg total) once daily for 4 days, THEN 0.5 tablets (10 mg total) once daily for 4 days.  Dispense: 20 tablet; Refill: 0    Itching  -     hydrOXYzine HCL (ATARAX) 25 MG tablet; Take 1 tablet (25 mg total) by mouth 3 (three) times daily as needed for Itching.  Dispense: 20 tablet; Refill: 0             Patient Instructions   Recommended to use nonscented detergents (All Free and clear), body  washes (Dove sensitive skin), and body lotions  (Cerave, Cetaphil).     Use Hydrocortisone 2.5% cream twice a day to body as needed for rash.     Take antihistamine such as Zyrtec, Clartin, Allegra, Xyzal daily    Prescription hydroxyzine at night time as needed for itching. Will cause drowsiness  Benadryl at night time as needed for itching    If symptoms persist in 2 days, please start oral prednisone and take until completed    Please remember that you have received care at an urgent care today. Urgent cares are not emergency rooms and are not equipped to handle life threatening emergencies and cannot rule in or out certain medical conditions and you may be released before all of your medical problems are known or treated. Please arrange follow up with your primary care physician or speciality clinic  within 2-5 days if your signs and symptoms have not resolved or worsen. Patient can call our Referral Hotline at [phone] to make an appointment.    Please return here or go to the Emergency Department for any concerns or worsening of condition.Patient was educated on signs/symptoms that would warrant emergent medical attention. Patient verbalized understanding.  You start to have severe trouble breathing or swallowing (for example, you cannot speak in full sentences).  The rash spreads over large parts of your body and most of your skin becomes red.  It is becoming hard to breathe, but you can still talk in full sentences.  You have a fever of 100.4°F (38°C) or higher or chills.  You have signs of a wound infection like swelling, redness, warmth, pain, or drainage from the wound.

## 2025-06-09 NOTE — PATIENT INSTRUCTIONS
Recommended to use nonscented detergents (All Free and clear), body washes (Dove sensitive skin), and body lotions  (Cerave, Cetaphil).     Use Hydrocortisone 2.5% cream twice a day to body as needed for rash.     Take antihistamine such as Zyrtec, Clartin, Allegra, Xyzal daily    Prescription hydroxyzine at night time as needed for itching. Will cause drowsiness  Benadryl at night time as needed for itching    If symptoms persist in 2 days, please start oral prednisone and take until completed    Please remember that you have received care at an urgent care today. Urgent cares are not emergency rooms and are not equipped to handle life threatening emergencies and cannot rule in or out certain medical conditions and you may be released before all of your medical problems are known or treated. Please arrange follow up with your primary care physician or speciality clinic  within 2-5 days if your signs and symptoms have not resolved or worsen. Patient can call our Referral Hotline at [phone] to make an appointment.    Please return here or go to the Emergency Department for any concerns or worsening of condition.Patient was educated on signs/symptoms that would warrant emergent medical attention. Patient verbalized understanding.  You start to have severe trouble breathing or swallowing (for example, you cannot speak in full sentences).  The rash spreads over large parts of your body and most of your skin becomes red.  It is becoming hard to breathe, but you can still talk in full sentences.  You have a fever of 100.4°F (38°C) or higher or chills.  You have signs of a wound infection like swelling, redness, warmth, pain, or drainage from the wound.

## 2025-08-21 ENCOUNTER — E-VISIT (OUTPATIENT)
Dept: URGENT CARE | Facility: CLINIC | Age: 30
End: 2025-08-21
Payer: COMMERCIAL

## 2025-08-21 DIAGNOSIS — N80.9 ENDOMETRIOSIS: ICD-10-CM

## 2025-08-21 RX ORDER — NORETHINDRONE ACETATE AND ETHINYL ESTRADIOL 1.5-30(21)
1 KIT ORAL DAILY
Qty: 84 TABLET | Refills: 0 | Status: SHIPPED | OUTPATIENT
Start: 2025-08-21

## (undated) DEVICE — PROBE ARTHO ENERGY 90 DEG

## (undated) DEVICE — SOL IRR SOD CHL .9% POUR

## (undated) DEVICE — Device

## (undated) DEVICE — ELECTRODE REM PLYHSV RETURN 9

## (undated) DEVICE — GLOVE BIOGEL SKINSENSE PI 8.5

## (undated) DEVICE — CANNULA SHOULDER 10/BOX

## (undated) DEVICE — SOL NACL IRR 3000ML

## (undated) DEVICE — GLOVE BIOGEL SKINSENSE PI 6.5

## (undated) DEVICE — KIT ASSISTARM SH KN STRL

## (undated) DEVICE — UNDERGLOVE BIOGEL PI SZ 6.5 LF

## (undated) DEVICE — SYR 10CC LUER LOCK

## (undated) DEVICE — PAD ABDOMINAL STERILE 8X10IN

## (undated) DEVICE — SYR 30CC LUER LOCK

## (undated) DEVICE — SET Y-TYPE TUR IRRIGATION 96IN

## (undated) DEVICE — SUT PDS VIL/BLU DUAL ORTHO

## (undated) DEVICE — TIP YANKAUERS BULB NO VENT

## (undated) DEVICE — APPLICATOR CHLORAPREP ORN 26ML

## (undated) DEVICE — SPONGE COTTON TRAY 4X4IN

## (undated) DEVICE — DRAPE STERI U-SHAPED 47X51IN

## (undated) DEVICE — STRIP MEDI WND CLSR 1/2X4IN

## (undated) DEVICE — BLADE GATOR 4.2